# Patient Record
Sex: MALE | Race: WHITE | Employment: UNEMPLOYED | URBAN - METROPOLITAN AREA
[De-identification: names, ages, dates, MRNs, and addresses within clinical notes are randomized per-mention and may not be internally consistent; named-entity substitution may affect disease eponyms.]

---

## 2017-02-02 DIAGNOSIS — E03.9 HYPOTHYROIDISM (ACQUIRED): ICD-10-CM

## 2017-02-02 RX ORDER — LEVOTHYROXINE SODIUM 175 UG/1
TABLET ORAL
Qty: 30 TABLET | Refills: 0 | Status: SHIPPED | OUTPATIENT
Start: 2017-02-02 | End: 2017-02-23 | Stop reason: SDUPTHER

## 2017-02-23 ENCOUNTER — OFFICE VISIT (OUTPATIENT)
Dept: PRIMARY CARE CLINIC | Age: 32
End: 2017-02-23

## 2017-02-23 VITALS
DIASTOLIC BLOOD PRESSURE: 80 MMHG | OXYGEN SATURATION: 98 % | HEIGHT: 71 IN | TEMPERATURE: 95.7 F | RESPIRATION RATE: 16 BRPM | WEIGHT: 232.4 LBS | BODY MASS INDEX: 32.53 KG/M2 | HEART RATE: 69 BPM | SYSTOLIC BLOOD PRESSURE: 118 MMHG

## 2017-02-23 DIAGNOSIS — E03.9 HYPOTHYROIDISM (ACQUIRED): ICD-10-CM

## 2017-02-23 DIAGNOSIS — Z23 NEED FOR INFLUENZA VACCINATION: ICD-10-CM

## 2017-02-23 DIAGNOSIS — F31.32 BIPOLAR DISORDER WITH MODERATE DEPRESSION (HCC): ICD-10-CM

## 2017-02-23 DIAGNOSIS — G56.03 BILATERAL CARPAL TUNNEL SYNDROME: Primary | ICD-10-CM

## 2017-02-23 LAB
ALBUMIN SERPL-MCNC: 4.6 G/DL (ref 3.9–4.9)
ALP BLD-CCNC: 82 U/L (ref 35–104)
ALT SERPL-CCNC: 107 U/L (ref 0–41)
ANION GAP SERPL CALCULATED.3IONS-SCNC: 13 MEQ/L (ref 7–13)
AST SERPL-CCNC: 58 U/L (ref 0–40)
BILIRUB SERPL-MCNC: 1.1 MG/DL (ref 0–1.2)
BUN BLDV-MCNC: 17 MG/DL (ref 6–20)
CALCIUM SERPL-MCNC: 9.4 MG/DL (ref 8.6–10.2)
CHLORIDE BLD-SCNC: 101 MEQ/L (ref 98–107)
CO2: 25 MEQ/L (ref 22–29)
CREAT SERPL-MCNC: 0.84 MG/DL (ref 0.7–1.2)
GFR AFRICAN AMERICAN: >60
GFR NON-AFRICAN AMERICAN: >60
GLOBULIN: 2.9 G/DL (ref 2.3–3.5)
GLUCOSE BLD-MCNC: 75 MG/DL (ref 74–109)
POTASSIUM SERPL-SCNC: 4.1 MEQ/L (ref 3.5–5.1)
SODIUM BLD-SCNC: 139 MEQ/L (ref 132–144)
TOTAL PROTEIN: 7.5 G/DL (ref 6.4–8.1)
TSH SERPL DL<=0.05 MIU/L-ACNC: 9.04 UIU/ML (ref 0.27–4.2)

## 2017-02-23 PROCEDURE — 90688 IIV4 VACCINE SPLT 0.5 ML IM: CPT | Performed by: FAMILY MEDICINE

## 2017-02-23 PROCEDURE — 99214 OFFICE O/P EST MOD 30 MIN: CPT | Performed by: FAMILY MEDICINE

## 2017-02-23 PROCEDURE — 90471 IMMUNIZATION ADMIN: CPT | Performed by: FAMILY MEDICINE

## 2017-02-23 RX ORDER — PREDNISONE 10 MG/1
TABLET ORAL
Qty: 25 TABLET | Refills: 0 | Status: SHIPPED | OUTPATIENT
Start: 2017-02-23 | End: 2017-03-09

## 2017-02-23 RX ORDER — DULOXETIN HYDROCHLORIDE 60 MG/1
60 CAPSULE, DELAYED RELEASE ORAL DAILY
Qty: 30 CAPSULE | Refills: 3 | Status: SHIPPED | OUTPATIENT
Start: 2017-02-23 | End: 2017-06-29 | Stop reason: SDUPTHER

## 2017-02-23 RX ORDER — LEVOTHYROXINE SODIUM 175 UG/1
TABLET ORAL
Qty: 30 TABLET | Refills: 0 | Status: SHIPPED | OUTPATIENT
Start: 2017-02-23 | End: 2017-11-12

## 2017-02-23 ASSESSMENT — ENCOUNTER SYMPTOMS
CONSTIPATION: 0
ABDOMINAL PAIN: 0
DIARRHEA: 0

## 2017-02-24 LAB — T4 FREE: 1.17 NG/DL (ref 0.93–1.7)

## 2017-02-26 ENCOUNTER — OFFICE VISIT (OUTPATIENT)
Dept: PHYSICAL MEDICINE AND REHAB | Age: 32
End: 2017-02-26

## 2017-02-26 DIAGNOSIS — F41.9 ANXIETY: ICD-10-CM

## 2017-02-26 DIAGNOSIS — G44.221 CHRONIC TENSION-TYPE HEADACHE, INTRACTABLE: ICD-10-CM

## 2017-02-26 DIAGNOSIS — M47.812 CERVICAL SPONDYLOSIS WITHOUT MYELOPATHY: ICD-10-CM

## 2017-02-26 DIAGNOSIS — F33.41 RECURRENT MAJOR DEPRESSIVE DISORDER, IN PARTIAL REMISSION (HCC): ICD-10-CM

## 2017-02-26 DIAGNOSIS — G56.23 ULNAR NEUROPATHY OF BOTH UPPER EXTREMITIES: ICD-10-CM

## 2017-02-26 DIAGNOSIS — M25.531 PAIN IN BOTH WRISTS: ICD-10-CM

## 2017-02-26 DIAGNOSIS — M25.532 PAIN IN BOTH WRISTS: ICD-10-CM

## 2017-02-26 DIAGNOSIS — M54.2 NECK PAIN: ICD-10-CM

## 2017-02-26 DIAGNOSIS — G56.03 BILATERAL CARPAL TUNNEL SYNDROME: Primary | ICD-10-CM

## 2017-02-26 DIAGNOSIS — R29.898 HAND WEAKNESS: ICD-10-CM

## 2017-02-26 PROBLEM — I10 BP (HIGH BLOOD PRESSURE): Status: ACTIVE | Noted: 2017-02-26

## 2017-02-26 PROBLEM — B19.20 HEPATITIS C VIRUS INFECTION: Status: ACTIVE | Noted: 2017-02-26

## 2017-02-26 PROBLEM — E80.6 HYPERBILIRUBINEMIA: Status: ACTIVE | Noted: 2017-02-26

## 2017-02-26 PROBLEM — G44.209 TENSION TYPE HEADACHE: Status: ACTIVE | Noted: 2017-02-26

## 2017-02-26 PROBLEM — F32.4 MAJOR DEPRESSIVE DISORDER IN PARTIAL REMISSION (HCC): Status: ACTIVE | Noted: 2017-02-26

## 2017-02-26 PROBLEM — T40.1X1A DIACETYLMORPHINE OVERDOSE (HCC): Status: RESOLVED | Noted: 2017-02-26 | Resolved: 2017-02-26

## 2017-02-26 PROBLEM — T40.1X1A DIACETYLMORPHINE OVERDOSE (HCC): Status: ACTIVE | Noted: 2017-02-26

## 2017-02-26 PROBLEM — K75.81 STEATOHEPATITIS: Status: ACTIVE | Noted: 2017-02-26

## 2017-02-26 PROCEDURE — 95886 MUSC TEST DONE W/N TEST COMP: CPT | Performed by: PHYSICAL MEDICINE & REHABILITATION

## 2017-02-26 PROCEDURE — 95912 NRV CNDJ TEST 11-12 STUDIES: CPT | Performed by: PHYSICAL MEDICINE & REHABILITATION

## 2017-02-26 RX ORDER — TRANSPARENT DRESSING 2.37X2.75"
1 BANDAGE TOPICAL NIGHTLY
Qty: 1 EACH | Refills: 0 | Status: SHIPPED | OUTPATIENT
Start: 2017-02-26 | End: 2017-03-07

## 2017-03-05 ASSESSMENT — ENCOUNTER SYMPTOMS
SHORTNESS OF BREATH: 0
COUGH: 0

## 2017-03-07 ENCOUNTER — OFFICE VISIT (OUTPATIENT)
Dept: PRIMARY CARE CLINIC | Age: 32
End: 2017-03-07

## 2017-03-07 VITALS
TEMPERATURE: 98.1 F | WEIGHT: 223 LBS | HEART RATE: 72 BPM | BODY MASS INDEX: 31.22 KG/M2 | HEIGHT: 71 IN | DIASTOLIC BLOOD PRESSURE: 90 MMHG | RESPIRATION RATE: 14 BRPM | SYSTOLIC BLOOD PRESSURE: 132 MMHG

## 2017-03-07 DIAGNOSIS — E03.9 HYPOTHYROIDISM (ACQUIRED): ICD-10-CM

## 2017-03-07 DIAGNOSIS — B18.2 CHRONIC HEPATITIS C WITHOUT HEPATIC COMA (HCC): ICD-10-CM

## 2017-03-07 DIAGNOSIS — K75.81 STEATOHEPATITIS: ICD-10-CM

## 2017-03-07 DIAGNOSIS — R10.9 ABDOMINAL PAIN, UNSPECIFIED LOCATION: Primary | ICD-10-CM

## 2017-03-07 DIAGNOSIS — R17 JAUNDICE: ICD-10-CM

## 2017-03-07 DIAGNOSIS — R63.4 WEIGHT LOSS: ICD-10-CM

## 2017-03-07 DIAGNOSIS — R10.9 ABDOMINAL PAIN, UNSPECIFIED LOCATION: ICD-10-CM

## 2017-03-07 LAB
BILIRUBIN, POC: NORMAL
BLOOD URINE, POC: NORMAL
CLARITY, POC: NORMAL
COLOR, POC: NORMAL
GLUCOSE URINE, POC: NORMAL
KETONES, POC: NORMAL
LEUKOCYTE EST, POC: NORMAL
NITRITE, POC: NORMAL
PH, POC: 6
PROTEIN, POC: NORMAL
SPECIFIC GRAVITY, POC: 1.03
UROBILINOGEN, POC: NORMAL

## 2017-03-07 PROCEDURE — 99214 OFFICE O/P EST MOD 30 MIN: CPT | Performed by: FAMILY MEDICINE

## 2017-03-07 PROCEDURE — 81002 URINALYSIS NONAUTO W/O SCOPE: CPT | Performed by: FAMILY MEDICINE

## 2017-03-07 RX ORDER — LEVOTHYROXINE SODIUM 200 MCG
200 TABLET ORAL DAILY
Qty: 90 TABLET | Refills: 3 | Status: SHIPPED | OUTPATIENT
Start: 2017-03-07 | End: 2018-07-31

## 2017-03-07 ASSESSMENT — ENCOUNTER SYMPTOMS
BELCHING: 0
VOMITING: 0
DIARRHEA: 0
FLATUS: 0
NAUSEA: 1
CONSTIPATION: 0
ABDOMINAL PAIN: 1
HEMATOCHEZIA: 0

## 2017-03-09 LAB — URINE CULTURE, ROUTINE: NORMAL

## 2017-06-29 DIAGNOSIS — F31.32 BIPOLAR DISORDER WITH MODERATE DEPRESSION (HCC): ICD-10-CM

## 2017-06-29 RX ORDER — DULOXETIN HYDROCHLORIDE 60 MG/1
CAPSULE, DELAYED RELEASE ORAL
Qty: 30 CAPSULE | Refills: 2 | Status: ON HOLD | OUTPATIENT
Start: 2017-06-29 | End: 2017-11-19 | Stop reason: HOSPADM

## 2017-11-11 ENCOUNTER — HOSPITAL ENCOUNTER (INPATIENT)
Age: 32
LOS: 7 days | Discharge: HOME | DRG: 885 | End: 2017-11-19
Attending: PSYCHIATRY & NEUROLOGY | Admitting: PSYCHIATRY & NEUROLOGY
Payer: COMMERCIAL

## 2017-11-11 DIAGNOSIS — F32.2 SEVERE MAJOR DEPRESSION (HCC): Primary | ICD-10-CM

## 2017-11-11 PROCEDURE — 82550 ASSAY OF CK (CPK): CPT

## 2017-11-11 PROCEDURE — 99285 EMERGENCY DEPT VISIT HI MDM: CPT

## 2017-11-11 PROCEDURE — 84443 ASSAY THYROID STIM HORMONE: CPT

## 2017-11-11 PROCEDURE — 82553 CREATINE MB FRACTION: CPT

## 2017-11-11 PROCEDURE — G0480 DRUG TEST DEF 1-7 CLASSES: HCPCS

## 2017-11-11 PROCEDURE — 80053 COMPREHEN METABOLIC PANEL: CPT

## 2017-11-11 PROCEDURE — 0HQEXZZ REPAIR LEFT LOWER ARM SKIN, EXTERNAL APPROACH: ICD-10-PCS | Performed by: PHYSICIAN ASSISTANT

## 2017-11-12 LAB
ALBUMIN SERPL-MCNC: 4.3 G/DL (ref 3.9–4.9)
ALP BLD-CCNC: 82 U/L (ref 35–104)
ALT SERPL-CCNC: 102 U/L (ref 0–41)
AMPHETAMINE SCREEN, URINE: ABNORMAL
ANION GAP SERPL CALCULATED.3IONS-SCNC: 15 MEQ/L (ref 7–13)
AST SERPL-CCNC: 80 U/L (ref 0–40)
BARBITURATE SCREEN URINE: ABNORMAL
BASOPHILS ABSOLUTE: 0.1 K/UL (ref 0–0.2)
BASOPHILS RELATIVE PERCENT: 0.5 %
BENZODIAZEPINE SCREEN, URINE: ABNORMAL
BILIRUB SERPL-MCNC: 0.3 MG/DL (ref 0–1.2)
BILIRUBIN URINE: NEGATIVE
BLOOD, URINE: NEGATIVE
BUN BLDV-MCNC: 8 MG/DL (ref 6–20)
CALCIUM SERPL-MCNC: 9.3 MG/DL (ref 8.6–10.2)
CANNABINOID SCREEN URINE: POSITIVE
CHLORIDE BLD-SCNC: 107 MEQ/L (ref 98–107)
CK MB: 6.2 NG/ML (ref 0–6.7)
CLARITY: CLEAR
CO2: 23 MEQ/L (ref 22–29)
COCAINE METABOLITE SCREEN URINE: ABNORMAL
COLOR: YELLOW
CREAT SERPL-MCNC: 0.7 MG/DL (ref 0.7–1.2)
CREATINE KINASE-MB INDEX: 1.5 % (ref 0–3.5)
EKG ATRIAL RATE: 59 BPM
EKG P AXIS: 20 DEGREES
EKG P-R INTERVAL: 176 MS
EKG Q-T INTERVAL: 398 MS
EKG QRS DURATION: 88 MS
EKG QTC CALCULATION (BAZETT): 394 MS
EKG R AXIS: -2 DEGREES
EKG T AXIS: 13 DEGREES
EKG VENTRICULAR RATE: 59 BPM
EOSINOPHILS ABSOLUTE: 0.1 K/UL (ref 0–0.7)
EOSINOPHILS RELATIVE PERCENT: 1.1 %
ETHANOL PERCENT: 0.09 G/DL
ETHANOL PERCENT: 0.23 G/DL
ETHANOL: 107 MG/DL (ref 0–0.08)
ETHANOL: 265 MG/DL (ref 0–0.08)
GFR AFRICAN AMERICAN: >60
GFR NON-AFRICAN AMERICAN: >60
GLOBULIN: 3.1 G/DL (ref 2.3–3.5)
GLUCOSE BLD-MCNC: 98 MG/DL (ref 74–109)
GLUCOSE URINE: NEGATIVE MG/DL
HCT VFR BLD CALC: 49.7 % (ref 42–52)
HEMOGLOBIN: 17 G/DL (ref 14–18)
KETONES, URINE: NEGATIVE MG/DL
LEUKOCYTE ESTERASE, URINE: NEGATIVE
LYMPHOCYTES ABSOLUTE: 3.4 K/UL (ref 1–4.8)
LYMPHOCYTES RELATIVE PERCENT: 33.5 %
Lab: ABNORMAL
MCH RBC QN AUTO: 32 PG (ref 27–31.3)
MCHC RBC AUTO-ENTMCNC: 34.2 % (ref 33–37)
MCV RBC AUTO: 93.7 FL (ref 80–100)
METHADONE SCREEN, URINE: ABNORMAL
MONOCYTES ABSOLUTE: 0.7 K/UL (ref 0.2–0.8)
MONOCYTES RELATIVE PERCENT: 6.8 %
NEUTROPHILS ABSOLUTE: 5.9 K/UL (ref 1.4–6.5)
NEUTROPHILS RELATIVE PERCENT: 58.1 %
NITRITE, URINE: NEGATIVE
OPIATE SCREEN URINE: ABNORMAL
PDW BLD-RTO: 13.4 % (ref 11.5–14.5)
PH UA: 5.5 (ref 5–9)
PHENCYCLIDINE SCREEN URINE: ABNORMAL
PLATELET # BLD: 248 K/UL (ref 130–400)
POTASSIUM SERPL-SCNC: 4.4 MEQ/L (ref 3.5–5.1)
PROTEIN UA: NEGATIVE MG/DL
RBC # BLD: 5.31 M/UL (ref 4.7–6.1)
SODIUM BLD-SCNC: 145 MEQ/L (ref 132–144)
SPECIFIC GRAVITY UA: 1.01 (ref 1–1.03)
TOTAL CK: 410 U/L (ref 0–190)
TOTAL PROTEIN: 7.4 G/DL (ref 6.4–8.1)
TRICYCLIC, URINE: ABNORMAL
TSH SERPL DL<=0.05 MIU/L-ACNC: 8.36 UIU/ML (ref 0.27–4.2)
UROBILINOGEN, URINE: 0.2 E.U./DL
WBC # BLD: 10.1 K/UL (ref 4.8–10.8)

## 2017-11-12 PROCEDURE — 93005 ELECTROCARDIOGRAM TRACING: CPT

## 2017-11-12 PROCEDURE — 36415 COLL VENOUS BLD VENIPUNCTURE: CPT

## 2017-11-12 PROCEDURE — 6360000002 HC RX W HCPCS: Performed by: FAMILY MEDICINE

## 2017-11-12 PROCEDURE — 85025 COMPLETE CBC W/AUTO DIFF WBC: CPT

## 2017-11-12 PROCEDURE — 2500000003 HC RX 250 WO HCPCS: Performed by: PHYSICIAN ASSISTANT

## 2017-11-12 PROCEDURE — 6370000000 HC RX 637 (ALT 250 FOR IP): Performed by: FAMILY MEDICINE

## 2017-11-12 PROCEDURE — 6370000000 HC RX 637 (ALT 250 FOR IP): Performed by: PSYCHIATRY & NEUROLOGY

## 2017-11-12 PROCEDURE — 6370000000 HC RX 637 (ALT 250 FOR IP): Performed by: NURSE PRACTITIONER

## 2017-11-12 PROCEDURE — 81003 URINALYSIS AUTO W/O SCOPE: CPT

## 2017-11-12 PROCEDURE — 6370000000 HC RX 637 (ALT 250 FOR IP): Performed by: PHYSICIAN ASSISTANT

## 2017-11-12 PROCEDURE — G0480 DRUG TEST DEF 1-7 CLASSES: HCPCS

## 2017-11-12 PROCEDURE — 1240000000 HC EMOTIONAL WELLNESS R&B

## 2017-11-12 PROCEDURE — 80307 DRUG TEST PRSMV CHEM ANLYZR: CPT

## 2017-11-12 RX ORDER — LIDOCAINE HYDROCHLORIDE AND EPINEPHRINE 10; 10 MG/ML; UG/ML
20 INJECTION, SOLUTION INFILTRATION; PERINEURAL ONCE
Status: COMPLETED | OUTPATIENT
Start: 2017-11-12 | End: 2017-11-12

## 2017-11-12 RX ORDER — LIDOCAINE HYDROCHLORIDE AND EPINEPHRINE BITARTRATE 20; .01 MG/ML; MG/ML
20 INJECTION, SOLUTION SUBCUTANEOUS ONCE
Status: DISCONTINUED | OUTPATIENT
Start: 2017-11-12 | End: 2017-11-12

## 2017-11-12 RX ORDER — HYDROXYZINE PAMOATE 50 MG/1
50 CAPSULE ORAL EVERY 6 HOURS PRN
Status: DISCONTINUED | OUTPATIENT
Start: 2017-11-12 | End: 2017-11-19 | Stop reason: HOSPADM

## 2017-11-12 RX ORDER — MAGNESIUM HYDROXIDE/ALUMINUM HYDROXICE/SIMETHICONE 120; 1200; 1200 MG/30ML; MG/30ML; MG/30ML
30 SUSPENSION ORAL PRN
Status: DISCONTINUED | OUTPATIENT
Start: 2017-11-12 | End: 2017-11-19 | Stop reason: HOSPADM

## 2017-11-12 RX ORDER — CHLORDIAZEPOXIDE HYDROCHLORIDE 25 MG/1
25 CAPSULE, GELATIN COATED ORAL EVERY 4 HOURS PRN
Status: DISCONTINUED | OUTPATIENT
Start: 2017-11-12 | End: 2017-11-15 | Stop reason: ALTCHOICE

## 2017-11-12 RX ORDER — CHLORDIAZEPOXIDE HYDROCHLORIDE 25 MG/1
50 CAPSULE, GELATIN COATED ORAL
Status: DISCONTINUED | OUTPATIENT
Start: 2017-11-12 | End: 2017-11-15 | Stop reason: ALTCHOICE

## 2017-11-12 RX ORDER — THIAMINE MONONITRATE (VIT B1) 100 MG
100 TABLET ORAL DAILY
Status: DISCONTINUED | OUTPATIENT
Start: 2017-11-12 | End: 2017-11-19

## 2017-11-12 RX ORDER — MULTIVITAMIN WITH FOLIC ACID 400 MCG
1 TABLET ORAL DAILY
Status: DISCONTINUED | OUTPATIENT
Start: 2017-11-12 | End: 2017-11-19

## 2017-11-12 RX ORDER — NICOTINE 21 MG/24HR
1 PATCH, TRANSDERMAL 24 HOURS TRANSDERMAL DAILY
Status: DISCONTINUED | OUTPATIENT
Start: 2017-11-12 | End: 2017-11-19 | Stop reason: HOSPADM

## 2017-11-12 RX ORDER — CHLORDIAZEPOXIDE HYDROCHLORIDE 25 MG/1
50 CAPSULE, GELATIN COATED ORAL ONCE
Status: COMPLETED | OUTPATIENT
Start: 2017-11-12 | End: 2017-11-12

## 2017-11-12 RX ORDER — DULOXETIN HYDROCHLORIDE 60 MG/1
60 CAPSULE, DELAYED RELEASE ORAL DAILY
Status: DISCONTINUED | OUTPATIENT
Start: 2017-11-13 | End: 2017-11-13

## 2017-11-12 RX ORDER — ACETAMINOPHEN 325 MG/1
650 TABLET ORAL EVERY 4 HOURS PRN
Status: DISCONTINUED | OUTPATIENT
Start: 2017-11-12 | End: 2017-11-19 | Stop reason: HOSPADM

## 2017-11-12 RX ORDER — HALOPERIDOL 5 MG/ML
5 INJECTION INTRAMUSCULAR EVERY 4 HOURS PRN
Status: DISCONTINUED | OUTPATIENT
Start: 2017-11-12 | End: 2017-11-15 | Stop reason: ALTCHOICE

## 2017-11-12 RX ORDER — TRAZODONE HYDROCHLORIDE 50 MG/1
50 TABLET ORAL NIGHTLY PRN
Status: DISCONTINUED | OUTPATIENT
Start: 2017-11-12 | End: 2017-11-15

## 2017-11-12 RX ORDER — HYDROXYZINE HYDROCHLORIDE 50 MG/ML
50 INJECTION, SOLUTION INTRAMUSCULAR EVERY 6 HOURS PRN
Status: DISCONTINUED | OUTPATIENT
Start: 2017-11-12 | End: 2017-11-12 | Stop reason: SDUPTHER

## 2017-11-12 RX ORDER — LORAZEPAM 1 MG/1
4 TABLET ORAL
Status: DISCONTINUED | OUTPATIENT
Start: 2017-11-12 | End: 2017-11-19 | Stop reason: HOSPADM

## 2017-11-12 RX ORDER — FOLIC ACID 1 MG/1
1 TABLET ORAL DAILY
Status: DISCONTINUED | OUTPATIENT
Start: 2017-11-12 | End: 2017-11-19

## 2017-11-12 RX ORDER — IBUPROFEN 600 MG/1
600 TABLET ORAL EVERY 6 HOURS PRN
Status: DISCONTINUED | OUTPATIENT
Start: 2017-11-12 | End: 2017-11-19 | Stop reason: HOSPADM

## 2017-11-12 RX ORDER — IBUPROFEN 800 MG/1
800 TABLET ORAL ONCE
Status: COMPLETED | OUTPATIENT
Start: 2017-11-12 | End: 2017-11-12

## 2017-11-12 RX ORDER — CHLORDIAZEPOXIDE HYDROCHLORIDE 25 MG/1
75 CAPSULE, GELATIN COATED ORAL
Status: DISCONTINUED | OUTPATIENT
Start: 2017-11-12 | End: 2017-11-15 | Stop reason: ALTCHOICE

## 2017-11-12 RX ORDER — CHLORDIAZEPOXIDE HYDROCHLORIDE 10 MG/1
10 CAPSULE, GELATIN COATED ORAL EVERY 4 HOURS PRN
Status: DISCONTINUED | OUTPATIENT
Start: 2017-11-12 | End: 2017-11-15 | Stop reason: ALTCHOICE

## 2017-11-12 RX ORDER — BENZTROPINE MESYLATE 1 MG/ML
2 INJECTION INTRAMUSCULAR; INTRAVENOUS 2 TIMES DAILY PRN
Status: DISCONTINUED | OUTPATIENT
Start: 2017-11-12 | End: 2017-11-19 | Stop reason: HOSPADM

## 2017-11-12 RX ADMIN — IBUPROFEN 600 MG: 600 TABLET, FILM COATED ORAL at 21:02

## 2017-11-12 RX ADMIN — HYDROXYZINE PAMOATE 50 MG: 50 CAPSULE ORAL at 17:42

## 2017-11-12 RX ADMIN — THERA TABS 1 TABLET: TAB at 16:28

## 2017-11-12 RX ADMIN — ACETAMINOPHEN 650 MG: 325 TABLET ORAL at 16:28

## 2017-11-12 RX ADMIN — CHLORDIAZEPOXIDE HYDROCHLORIDE 50 MG: 25 CAPSULE ORAL at 07:21

## 2017-11-12 RX ADMIN — Medication 100 MG: at 16:28

## 2017-11-12 RX ADMIN — HYDROXYZINE HYDROCHLORIDE 50 MG: 50 INJECTION, SOLUTION INTRAMUSCULAR at 12:22

## 2017-11-12 RX ADMIN — TRAZODONE HYDROCHLORIDE 50 MG: 50 TABLET ORAL at 21:02

## 2017-11-12 RX ADMIN — IBUPROFEN 800 MG: 800 TABLET, FILM COATED ORAL at 12:56

## 2017-11-12 RX ADMIN — LIDOCAINE HYDROCHLORIDE AND EPINEPHRINE 20 ML: 10; 10 INJECTION, SOLUTION INFILTRATION; PERINEURAL at 01:11

## 2017-11-12 ASSESSMENT — PAIN DESCRIPTION - LOCATION: LOCATION: WRIST

## 2017-11-12 ASSESSMENT — PAIN SCALES - GENERAL
PAINLEVEL_OUTOF10: 4
PAINLEVEL_OUTOF10: 8
PAINLEVEL_OUTOF10: 8
PAINLEVEL_OUTOF10: 5
PAINLEVEL_OUTOF10: 0

## 2017-11-12 ASSESSMENT — ENCOUNTER SYMPTOMS
COLOR CHANGE: 0
SHORTNESS OF BREATH: 0
ALLERGIC/IMMUNOLOGIC NEGATIVE: 1
TROUBLE SWALLOWING: 0
APNEA: 0
ABDOMINAL PAIN: 0
EYE PAIN: 0

## 2017-11-12 ASSESSMENT — PAIN DESCRIPTION - ORIENTATION: ORIENTATION: LEFT

## 2017-11-12 ASSESSMENT — PATIENT HEALTH QUESTIONNAIRE - PHQ9: SUM OF ALL RESPONSES TO PHQ QUESTIONS 1-9: 7

## 2017-11-12 NOTE — ED PROVIDER NOTES
sleep disturbance. All other systems reviewed and are negative. Except as noted above the remainder of the review of systems was reviewed and negative.        PAST MEDICAL HISTORY     Past Medical History:   Diagnosis Date    Anxiety     Bilateral carpal tunnel syndrome     Bipolar disorder with moderate depression ([facility] [address] 10/12/2015    BP (high blood pressure)     Depression     Diacetylmorphine overdose 2/26/2017    Hepatitis C virus infection     Hyperthyroidism     Hypothyroidism 3/7/2017    Hypothyroidism (acquired) 6/23/2015    Steatohepatitis          SURGICAL HISTORY       Past Surgical History:   Procedure Laterality Date    APPENDECTOMY           CURRENT MEDICATIONS       Current Discharge Medication List      CONTINUE these medications which have NOT CHANGED    Details   DULoxetine (CYMBALTA) 60 MG extended release capsule Take 1 capsule by mouth daily  Qty: 30 capsule, Refills: 2    Associated Diagnoses: Bipolar disorder with moderate depression (HCC)      SYNTHROID 200 MCG tablet Take 1 tablet by mouth daily  Qty: 90 tablet, Refills: 3             ALLERGIES     Sulfa antibiotics    FAMILY HISTORY       Family History   Problem Relation Age of Onset    Depression Mother     Depression Father     Depression Sister     Depression Brother     Cancer Maternal Grandfather     Heart Disease Paternal Grandmother     High Blood Pressure Paternal Grandmother     Other Paternal Grandfather           SOCIAL HISTORY       Social History     Social History    Marital status: Single     Spouse name: N/A    Number of children: N/A    Years of education: N/A     Social History Main Topics    Smoking status: Current Every Day Smoker     Packs/day: 1.00     Years: 15.00     Types: Cigarettes    Smokeless tobacco: Never Used    Alcohol use Yes      Comment: patient admits to being sober for a year but relapsing recently and having 10 beers tonight     Drug use:      Types: Opiates
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
## 2017-11-13 PROBLEM — F31.81 BIPOLAR 2 DISORDER, MAJOR DEPRESSIVE EPISODE (HCC): Status: ACTIVE | Noted: 2017-11-13

## 2017-11-13 PROBLEM — F10.239 ALCOHOL DEPENDENCE WITH WITHDRAWAL (HCC): Status: ACTIVE | Noted: 2017-11-13

## 2017-11-13 PROCEDURE — 6370000000 HC RX 637 (ALT 250 FOR IP): Performed by: PHYSICIAN ASSISTANT

## 2017-11-13 PROCEDURE — 99223 1ST HOSP IP/OBS HIGH 75: CPT | Performed by: PSYCHIATRY & NEUROLOGY

## 2017-11-13 PROCEDURE — 6370000000 HC RX 637 (ALT 250 FOR IP): Performed by: NURSE PRACTITIONER

## 2017-11-13 PROCEDURE — 93010 ELECTROCARDIOGRAM REPORT: CPT | Performed by: INTERNAL MEDICINE

## 2017-11-13 PROCEDURE — 1240000000 HC EMOTIONAL WELLNESS R&B

## 2017-11-13 PROCEDURE — 6370000000 HC RX 637 (ALT 250 FOR IP): Performed by: PSYCHIATRY & NEUROLOGY

## 2017-11-13 RX ORDER — BACITRACIN, NEOMYCIN, POLYMYXIN B 400; 3.5; 5 [USP'U]/G; MG/G; [USP'U]/G
OINTMENT TOPICAL 2 TIMES DAILY
Status: DISCONTINUED | OUTPATIENT
Start: 2017-11-13 | End: 2017-11-19

## 2017-11-13 RX ORDER — DULOXETIN HYDROCHLORIDE 30 MG/1
30 CAPSULE, DELAYED RELEASE ORAL DAILY
Status: DISCONTINUED | OUTPATIENT
Start: 2017-11-14 | End: 2017-11-16

## 2017-11-13 RX ADMIN — CHLORDIAZEPOXIDE HYDROCHLORIDE 10 MG: 10 CAPSULE ORAL at 09:18

## 2017-11-13 RX ADMIN — BACITRACIN ZINC, NEOMYCIN SULFATE, POLYMYXIN B SULFATE 3.5 G: 3.5; 5000; 4 OINTMENT TOPICAL at 13:41

## 2017-11-13 RX ADMIN — HYDROXYZINE PAMOATE 50 MG: 50 CAPSULE ORAL at 16:04

## 2017-11-13 RX ADMIN — LURASIDONE HYDROCHLORIDE 20 MG: 20 TABLET, FILM COATED ORAL at 13:42

## 2017-11-13 RX ADMIN — IBUPROFEN 600 MG: 600 TABLET, FILM COATED ORAL at 09:18

## 2017-11-13 RX ADMIN — FOLIC ACID 1 MG: 1 TABLET ORAL at 09:18

## 2017-11-13 RX ADMIN — THERA TABS 1 TABLET: TAB at 09:19

## 2017-11-13 RX ADMIN — TRAZODONE HYDROCHLORIDE 50 MG: 50 TABLET ORAL at 21:23

## 2017-11-13 RX ADMIN — Medication 100 MG: at 09:19

## 2017-11-13 RX ADMIN — BACITRACIN ZINC, NEOMYCIN SULFATE, POLYMYXIN B SULFATE: 3.5; 5000; 4 OINTMENT TOPICAL at 19:29

## 2017-11-13 RX ADMIN — CHLORDIAZEPOXIDE HYDROCHLORIDE 10 MG: 10 CAPSULE ORAL at 19:36

## 2017-11-13 RX ADMIN — CHLORDIAZEPOXIDE HYDROCHLORIDE 25 MG: 25 CAPSULE ORAL at 13:42

## 2017-11-13 RX ADMIN — HYDROXYZINE PAMOATE 50 MG: 50 CAPSULE ORAL at 09:18

## 2017-11-13 RX ADMIN — DULOXETINE HYDROCHLORIDE 60 MG: 60 CAPSULE, DELAYED RELEASE ORAL at 09:19

## 2017-11-13 RX ADMIN — ACETAMINOPHEN 650 MG: 325 TABLET ORAL at 13:41

## 2017-11-13 RX ADMIN — LEVOTHYROXINE SODIUM 200 MCG: 88 TABLET ORAL at 09:18

## 2017-11-13 RX ADMIN — IBUPROFEN 600 MG: 600 TABLET, FILM COATED ORAL at 16:04

## 2017-11-13 RX ADMIN — ACETAMINOPHEN 650 MG: 325 TABLET ORAL at 19:36

## 2017-11-13 ASSESSMENT — PAIN SCALES - GENERAL
PAINLEVEL_OUTOF10: 5
PAINLEVEL_OUTOF10: 6

## 2017-11-13 ASSESSMENT — SLEEP AND FATIGUE QUESTIONNAIRES
DO YOU HAVE DIFFICULTY SLEEPING: NO
DO YOU USE A SLEEP AID: NO
SLEEP PATTERN: NORMAL

## 2017-11-13 ASSESSMENT — LIFESTYLE VARIABLES: HISTORY_ALCOHOL_USE: YES
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
## 2017-11-13 NOTE — PLAN OF CARE
Problem: Altered Mood, Depressive Behavior  Goal: LTG-Able to verbalize acceptance of life and situations over which he or she has no control  Outcome: Ongoing    Goal: LTG-Able to verbalize and/or display a decrease in depressive symptoms  Outcome: Ongoing    Goal: STG-Absence of Self Harm  Outcome: Ongoing    Goal: STG-Knowledge of positive coping patterns  Outcome: Ongoing      Problem: Substance Abuse  Goal: LTG-Absence of acute withdrawl symptoms  Outcome: Ongoing    Goal: STG-Knowledge of community resources  Outcome: Ongoing
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
## 2017-11-13 NOTE — PROGRESS NOTES
Group Therapy Note    Date: 11/13/2017  Start Time: 1100  End Time:  1127  Number of Participants: 6    Type of Group: Psychoeducation    Wellness Binder Information  Module Name:    Session Number:      Patient's Goal:  \"To feel better\"    Notes:  Patient was attentive, helpful to other patients and overall participation was good. Status After Intervention:  Unchanged    Participation Level:  Active Listener    Participation Quality: Appropriate      Speech:  normal      Thought Process/Content: Linear      Affective Functioning: Congruent      Mood: calm      Level of consciousness:  Alert and Oriented x4      Response to Learning: Able to verbalize current knowledge/experience      Endings: None Reported    Modes of Intervention: Education, Socialization and Activity      Discipline Responsible: Psychoeducational Specialist      Signature:  John Echavarria

## 2017-11-13 NOTE — PROGRESS NOTES
Pt. attended the 0900 community meeting. Electronically signed by Laura [address] on 11/13/2017 at 10:01 AM

## 2017-11-13 NOTE — PROGRESS NOTES
Group Therapy Note    Date: 11/13/2017  Start Time: 10am  End Time:  11am  Number of Participants: 7    Type of Group: Psychotherapy    Wellness Binder Information  Module Name:  psychotherapy  Session Number:      Patient's Goal:  Improve coping, gain support    Notes: Talked about change and relapse     Status After Intervention:  Improved    Participation Level:  Active Listener and Interactive    Participation Quality: Appropriate, Attentive and Sharing      Speech:  normal      Thought Process/Content: Logical      Affective Functioning: Congruent      Mood: anxious and depressed      Level of consciousness:  Alert, Oriented x4 and Attentive      Response to Learning: Able to verbalize current knowledge/experience, Able to verbalize/acknowledge new learning and Capable of insight      Endings: None Reported    Modes of Intervention: Education, Support, Socialization, Exploration, Clarifying and Problem-solving      Discipline Responsible: /Counselor      Signature:  Orquidea Nava

## 2017-11-13 NOTE — BH NOTE
Patient did not attend recreation group at 1915 or wrap up group at 2000.     Electronically signed by Matt Moreno on 11/12/2017 at 9:48 PM

## 2017-11-13 NOTE — PROGRESS NOTES
Patient denies SI, HI, and AVH. Patient states that depression has improved, but anxiety remains high. Patient brief with answers, but reports that he is hopeful that mood will continue to improve now that he has restarted medications. Patient reports that due to personal circumstance he was unable to get prescriptions filled for a period of time, and then once he did get meds filled his supply was destroyed. Patient polite and cooperative with staff, isolates to room often. Does not appear social with peers at this time.  Electronically signed by Linda Agrawal LPN on 81/07/6312 at 6:21 PM

## 2017-11-13 NOTE — CONSULTS
[facility]  MEDICINE    HISTORY AND PHYSICAL EXAM    PATIENT NAME:  Miguelito Limon    MRN:  [id number]  SERVICE DATE:  11/13/2017   SERVICE TIME:  11:00 AM    Primary Care Physician: Toni Mcmillan DO         SUBJECTIVE  CHIEF COMPLAINT:  Medical clear for inpatient psychiatry admission. Consult for medical H/P encounter. HPI:  This is a 34 y.o. male who presents with suicidal attempt, cut wrist. Laceration to left wrist. Sutures to left wrist in ED. Denies CP, SOB, fever or chills, N/V    PAST MEDICAL HISTORY:    Past Medical History:   Diagnosis Date    Anxiety     Bilateral carpal tunnel syndrome     Bipolar disorder with moderate depression ([facility] [address] 10/12/2015    BP (high blood pressure)     Depression     Diacetylmorphine overdose 2/26/2017    Hepatitis C virus infection     Hyperthyroidism     Hypothyroidism 3/7/2017    Hypothyroidism (acquired) 6/23/2015    Steatohepatitis      PAST SURGICAL HISTORY:    Past Surgical History:   Procedure Laterality Date    APPENDECTOMY       FAMILY HISTORY:    Family History   Problem Relation Age of Onset    Depression Mother     Depression Father     Depression Sister     Depression Brother     Cancer Maternal Grandfather     Heart Disease Paternal Grandmother     High Blood Pressure Paternal Grandmother     Other Paternal Grandfather      SOCIAL HISTORY:    Social History     Social History    Marital status: Single     Spouse name: N/A    Number of children: N/A    Years of education: N/A     Occupational History    Not on file.      Social History Main Topics    Smoking status: Current Every Day Smoker     Packs/day: 1.00     Years: 15.00     Types: Cigarettes    Smokeless tobacco: Never Used    Alcohol use Yes      Comment: patient admits to being sober for a year but relapsing recently and having 10 beers tonight     Drug use:      Types: Opiates       Comment: 3 days ago    Sexual activity: Yes     Partners: Female     Other

## 2017-11-14 PROCEDURE — 6370000000 HC RX 637 (ALT 250 FOR IP): Performed by: PHYSICIAN ASSISTANT

## 2017-11-14 PROCEDURE — 6370000000 HC RX 637 (ALT 250 FOR IP): Performed by: NURSE PRACTITIONER

## 2017-11-14 PROCEDURE — 6360000002 HC RX W HCPCS: Performed by: PSYCHIATRY & NEUROLOGY

## 2017-11-14 PROCEDURE — 6370000000 HC RX 637 (ALT 250 FOR IP): Performed by: PSYCHIATRY & NEUROLOGY

## 2017-11-14 PROCEDURE — 1240000000 HC EMOTIONAL WELLNESS R&B

## 2017-11-14 PROCEDURE — 99233 SBSQ HOSP IP/OBS HIGH 50: CPT | Performed by: PSYCHIATRY & NEUROLOGY

## 2017-11-14 RX ORDER — GABAPENTIN 100 MG/1
100 CAPSULE ORAL 3 TIMES DAILY
Status: DISCONTINUED | OUTPATIENT
Start: 2017-11-14 | End: 2017-11-15

## 2017-11-14 RX ORDER — ONDANSETRON 4 MG/1
4 TABLET, FILM COATED ORAL EVERY 8 HOURS PRN
Status: DISCONTINUED | OUTPATIENT
Start: 2017-11-14 | End: 2017-11-19 | Stop reason: HOSPADM

## 2017-11-14 RX ADMIN — HYDROXYZINE PAMOATE 50 MG: 50 CAPSULE ORAL at 21:50

## 2017-11-14 RX ADMIN — IBUPROFEN 600 MG: 600 TABLET, FILM COATED ORAL at 09:27

## 2017-11-14 RX ADMIN — DULOXETINE HYDROCHLORIDE 30 MG: 30 CAPSULE, DELAYED RELEASE ORAL at 08:53

## 2017-11-14 RX ADMIN — ACETAMINOPHEN 650 MG: 325 TABLET ORAL at 17:17

## 2017-11-14 RX ADMIN — LURASIDONE HYDROCHLORIDE 20 MG: 20 TABLET, FILM COATED ORAL at 08:53

## 2017-11-14 RX ADMIN — LEVOTHYROXINE SODIUM 200 MCG: 88 TABLET ORAL at 05:52

## 2017-11-14 RX ADMIN — FOLIC ACID 1 MG: 1 TABLET ORAL at 08:53

## 2017-11-14 RX ADMIN — CHLORDIAZEPOXIDE HYDROCHLORIDE 10 MG: 10 CAPSULE ORAL at 02:28

## 2017-11-14 RX ADMIN — IBUPROFEN 600 MG: 600 TABLET, FILM COATED ORAL at 15:45

## 2017-11-14 RX ADMIN — CHLORDIAZEPOXIDE HYDROCHLORIDE 25 MG: 25 CAPSULE ORAL at 09:26

## 2017-11-14 RX ADMIN — THERA TABS 1 TABLET: TAB at 08:53

## 2017-11-14 RX ADMIN — HYDROXYZINE PAMOATE 50 MG: 50 CAPSULE ORAL at 15:45

## 2017-11-14 RX ADMIN — BACITRACIN ZINC, NEOMYCIN SULFATE, POLYMYXIN B SULFATE: 3.5; 5000; 4 OINTMENT TOPICAL at 19:59

## 2017-11-14 RX ADMIN — TRAZODONE HYDROCHLORIDE 50 MG: 50 TABLET ORAL at 21:50

## 2017-11-14 RX ADMIN — HYDROXYZINE PAMOATE 50 MG: 50 CAPSULE ORAL at 09:27

## 2017-11-14 RX ADMIN — BACITRACIN ZINC, NEOMYCIN SULFATE, POLYMYXIN B SULFATE 3.5 G: 3.5; 5000; 4 OINTMENT TOPICAL at 08:53

## 2017-11-14 RX ADMIN — IBUPROFEN 600 MG: 600 TABLET, FILM COATED ORAL at 21:50

## 2017-11-14 RX ADMIN — IBUPROFEN 600 MG: 600 TABLET, FILM COATED ORAL at 02:28

## 2017-11-14 RX ADMIN — Medication 100 MG: at 08:53

## 2017-11-14 RX ADMIN — ONDANSETRON HYDROCHLORIDE 4 MG: 4 TABLET, FILM COATED ORAL at 12:14

## 2017-11-14 RX ADMIN — HYDROXYZINE PAMOATE 50 MG: 50 CAPSULE ORAL at 02:28

## 2017-11-14 RX ADMIN — GABAPENTIN 100 MG: 100 CAPSULE ORAL at 14:44

## 2017-11-14 RX ADMIN — GABAPENTIN 100 MG: 100 CAPSULE ORAL at 21:50

## 2017-11-14 ASSESSMENT — PAIN SCALES - GENERAL
PAINLEVEL_OUTOF10: 4
PAINLEVEL_OUTOF10: 4
PAINLEVEL_OUTOF10: 6
PAINLEVEL_OUTOF10: 3
PAINLEVEL_OUTOF10: 5

## 2017-11-14 NOTE — PROGRESS NOTES
Group Therapy Note    Date: 11/14/2017  Start Time:1430  End Time: 1500    Number of Participants:6    Type of Group: Psychoeducation    Patient's Goal: To participate in mood management group. Notes:  Patient learned about the cycle of depression. Status After Intervention:  Improved    Participation Level: Active Listener    Participation Quality: Appropriate      Speech:  normal      Thought Process/Content: Logical      Affective Functioning: Congruent      Mood: elevated      Level of consciousness:  Alert      Response to Learning: Able to verbalize current knowledge/experience      Endings: None Reported    Modes of Intervention: Education      Discipline Responsible: /Counselor      Signature:   SAHRA Leiva

## 2017-11-14 NOTE — BH NOTE
Pt did not attend Wellness group at 1630 or Recreation group at 1900.     Electronically signed by Timm Soulier on 11/13/2017 at 11:06 PM

## 2017-11-14 NOTE — PROGRESS NOTES
[facility] FOLLOW-UP NOTE     11/14/2017     Patient was seen and examined in person, Chart reviewed   Patient's case discussed with staff/team    Chief Complaint: depression, SI, addiction    Interim History:     Pt is c/o nausea and dry heaving  Could not sleep last night  Still feeling depressed and hopeless  Passive SI, no active plans  Anxious ++   GF visited him yesterday    Appetite:   [x] Normal/Unchanged  [] Increased  [] Decreased      Sleep:       [] Normal/Unchanged  [] Fair       [x] Poor              Energy:    [] Normal/Unchanged  [] Increased  [] Decreased        SI [x] passive Present  [] Absent    HI  []Present  [x] Absent     Aggression:  [] yes  [x] no    Patient is [] able  [x] unable to CONTRACT FOR SAFETY     PAST MEDICAL/PSYCHIATRIC HISTORY:   Past Medical History:   Diagnosis Date    Anxiety     Bilateral carpal tunnel syndrome     Bipolar disorder with moderate depression ([facility] [address] 10/12/2015    BP (high blood pressure)     Depression     Diacetylmorphine overdose 2/26/2017    Hepatitis C virus infection     Hyperthyroidism     Hypothyroidism 3/7/2017    Hypothyroidism (acquired) 6/23/2015    Steatohepatitis        FAMILY/SOCIAL HISTORY:  Family History   Problem Relation Age of Onset    Depression Mother     Depression Father     Depression Sister     Depression Brother     Cancer Maternal Grandfather     Heart Disease Paternal Grandmother     High Blood Pressure Paternal Grandmother     Other Paternal Grandfather      Social History     Social History    Marital status: Single     Spouse name: N/A    Number of children: N/A    Years of education: N/A     Occupational History    Not on file.      Social History Main Topics    Smoking status: Current Every Day Smoker     Packs/day: 1.00     Years: 15.00     Types: Cigarettes    Smokeless tobacco: Never Used    Alcohol use Yes      Comment: patient admits to being sober for a year but relapsing recently and having 10 [place]     Drug use:      Types: Opiates       Comment: 3 days ago    Sexual activity: Yes     Partners: Female     Other Topics Concern    Not on file     Social History Narrative    No narrative on file           ROS:  [x] All negative/unchanged except if checked.  Explain positive(checked items) below:  [] Constitutional  [] Eyes  [] Ear/Nose/Mouth/Throat  [] Respiratory  [] CV  [] GI  []   [] Musculoskeletal  [] Skin/Breast  [] Neurological  [] Endocrine  [] Heme/Lymph  [] Allergic/Immunologic    Explanation:     MEDICATIONS:    Current Facility-Administered Medications:     DULoxetine (CYMBALTA) extended release capsule 30 mg, 30 mg, Oral, Daily, Ford Mak MD, 30 mg at 11/14/17 0853    lurasidone (LATUDA) tablet 20 mg, 20 mg, Oral, Daily, Ford Mak MD, 20 mg at 11/14/17 0853    neomycin-bacitracin-polymyxin (NEOSPORIN) ointment, , Topical, BID, Jose Fulling, CNP, 3.5 g at 11/14/17 0853    acetaminophen (TYLENOL) tablet 650 mg, 650 mg, Oral, Q4H PRN, Ford Mak MD, 650 mg at 11/13/17 1936    hydrOXYzine (VISTARIL) capsule 50 mg, 50 mg, Oral, Q6H PRN, Ford Mak MD, 50 mg at 11/14/17 0927    haloperidol lactate (HALDOL) injection 5 mg, 5 mg, Intramuscular, Q4H PRN, Ford Mak MD    traZODone (DESYREL) tablet 50 mg, 50 mg, Oral, Nightly PRN, Ford Mak MD, 50 mg at 11/13/17 2123    benztropine mesylate (COGENTIN) injection 2 mg, 2 mg, Intramuscular, BID PRN, Ford Mak MD    magnesium hydroxide (MILK OF MAGNESIA) 400 MG/5ML suspension 30 mL, 30 mL, Oral, Daily PRN, Ford Mak MD    aluminum & magnesium hydroxide-simethicone (MAALOX) 200-200-20 MG/5ML suspension 30 mL, 30 mL, Oral, PRN, Ford Mak MD    nicotine (NICODERM CQ) 21 MG/24HR 1 patch, 1 patch, Transdermal, Daily, Ford Mak MD, 1 patch at 11/14/17 0853    levothyroxine (SYNTHROID) tablet 200 mcg, 200 mcg, Oral, Daily, Ford Mak MD, 200 mcg at dependence with withdrawal ([facility] [address]      LABS:    Recent Labs      11/12/17   0140   WBC  10.1   HGB  17.0   PLT  248     Recent Labs      11/11/17   2345   NA  145*   K  4.4   CL  107   CO2  23   BUN  8   CREATININE  0.70   GLUCOSE  98     Recent Labs      11/11/17   2345   BILITOT  0.3   ALKPHOS  82   AST  80*   ALT  102*     Lab Results   Component Value Date    LABAMPH Neg 11/12/2017    BARBSCNU Neg 11/12/2017    LABBENZ Neg 11/12/2017    LABMETH Neg 11/12/2017    OPIATESCREENURINE Neg 11/12/2017    PHENCYCLIDINESCREENURINE Neg 11/12/2017    ETOH 107 11/12/2017     Lab Results   Component Value Date    TSH 8.360 11/11/2017     No results found for: LITHIUM  No results found for: VALPROATE, CBMZ    RISK ASSESSMENT: high suicide risk    Treatment Plan:  Reviewed current Medications with the patient. Alcohol withdrawal - continue librium   ANxiety- vistaril PRN and neurontin started  Nausea- zofran started  Bipolar depression- latuda 20 mg po q daily  Risks, benefits, side effects, drug-to-drug interactions and alternatives to treatment were discussed. Collateral information: pending  CD evaluation  Encourage patient to attend group and other milieu activities.   Discharge planning discussed with the patient and treatment team.    PSYCHOTHERAPY/COUNSELING:  [x] Therapeutic interview  [x] Supportive  [] CBT  [] Ongoing  [] Other    [x] Patient continues to need, on a daily basis, active treatment furnished directly by or requiring the supervision of inpatient psychiatric personnel      Anticipated Length of stay:            Electronically signed by Debbie Murcia MD on 11/14/2017 at 11:13 AM

## 2017-11-14 NOTE — PROGRESS NOTES
Group Therapy Note    Date: 11/14/2017  Start Time: 1100  End Time:  9095  Number of Participants: 7    Type of Group: Psychoeducation    Wellness Binder Information  Module Name:    Session Number:      Patient's Goal:  \"To feel better\"    Notes:  Patient work actively on his project and he was more relaxed in group. Status After Intervention:  Unchanged    Participation Level:  Active Listener    Participation Quality: Appropriate and Attentive      Speech:  normal      Thought Process/Content: Linear      Affective Functioning: Congruent      Mood: calm      Level of consciousness:  Alert and Oriented x4      Response to Learning: Able to verbalize current knowledge/experience      Endings: None Reported    Modes of Intervention: Education, Socialization and Activity      Discipline Responsible: Psychoeducational Specialist      Signature:  Cassidy Tyler

## 2017-11-14 NOTE — PROGRESS NOTES
FAMILY COLLATERAL NOTE    Family/Support Name: Uriel Harris  Contact #: [phone]  Relationship to Pt: mother      Placed call to above. Response: Left message for Holger Moss to call back for collateral information.          Lindsey Elizondo

## 2017-11-14 NOTE — PROGRESS NOTES
Pt out on the unit, ate lunch denies N&V, slight HA, social with peers during lunch. Pt back to room at this time. Electronically signed by Vianey Simeon RN on 11/14/2017 at 12:34 PM

## 2017-11-15 PROCEDURE — 6370000000 HC RX 637 (ALT 250 FOR IP): Performed by: PSYCHIATRY & NEUROLOGY

## 2017-11-15 PROCEDURE — 1240000000 HC EMOTIONAL WELLNESS R&B

## 2017-11-15 PROCEDURE — 6370000000 HC RX 637 (ALT 250 FOR IP): Performed by: PHYSICIAN ASSISTANT

## 2017-11-15 PROCEDURE — 6370000000 HC RX 637 (ALT 250 FOR IP): Performed by: NURSE PRACTITIONER

## 2017-11-15 PROCEDURE — 99232 SBSQ HOSP IP/OBS MODERATE 35: CPT | Performed by: PSYCHIATRY & NEUROLOGY

## 2017-11-15 RX ORDER — HALOPERIDOL 5 MG
5 TABLET ORAL EVERY 4 HOURS PRN
Status: DISCONTINUED | OUTPATIENT
Start: 2017-11-15 | End: 2017-11-19 | Stop reason: HOSPADM

## 2017-11-15 RX ORDER — TRAZODONE HYDROCHLORIDE 100 MG/1
100 TABLET ORAL NIGHTLY PRN
Status: DISCONTINUED | OUTPATIENT
Start: 2017-11-15 | End: 2017-11-16

## 2017-11-15 RX ORDER — GABAPENTIN 100 MG/1
200 CAPSULE ORAL 3 TIMES DAILY
Status: DISCONTINUED | OUTPATIENT
Start: 2017-11-15 | End: 2017-11-19 | Stop reason: HOSPADM

## 2017-11-15 RX ORDER — HALOPERIDOL 5 MG/ML
5 INJECTION INTRAMUSCULAR EVERY 4 HOURS PRN
Status: DISCONTINUED | OUTPATIENT
Start: 2017-11-15 | End: 2017-11-19 | Stop reason: HOSPADM

## 2017-11-15 RX ADMIN — GABAPENTIN 200 MG: 100 CAPSULE ORAL at 20:01

## 2017-11-15 RX ADMIN — IBUPROFEN 600 MG: 600 TABLET, FILM COATED ORAL at 18:20

## 2017-11-15 RX ADMIN — TRAZODONE HYDROCHLORIDE 100 MG: 100 TABLET ORAL at 20:00

## 2017-11-15 RX ADMIN — IBUPROFEN 600 MG: 600 TABLET, FILM COATED ORAL at 11:55

## 2017-11-15 RX ADMIN — THERA TABS 1 TABLET: TAB at 08:47

## 2017-11-15 RX ADMIN — HYDROXYZINE PAMOATE 50 MG: 50 CAPSULE ORAL at 11:55

## 2017-11-15 RX ADMIN — GABAPENTIN 200 MG: 100 CAPSULE ORAL at 13:21

## 2017-11-15 RX ADMIN — HYDROXYZINE PAMOATE 50 MG: 50 CAPSULE ORAL at 18:20

## 2017-11-15 RX ADMIN — BACITRACIN ZINC, NEOMYCIN SULFATE, POLYMYXIN B SULFATE 3.5 G: 3.5; 5000; 4 OINTMENT TOPICAL at 11:55

## 2017-11-15 RX ADMIN — LURASIDONE HYDROCHLORIDE 20 MG: 20 TABLET, FILM COATED ORAL at 08:47

## 2017-11-15 RX ADMIN — HALOPERIDOL 5 MG: 5 TABLET ORAL at 20:43

## 2017-11-15 RX ADMIN — DULOXETINE HYDROCHLORIDE 30 MG: 30 CAPSULE, DELAYED RELEASE ORAL at 08:47

## 2017-11-15 RX ADMIN — BACITRACIN ZINC, NEOMYCIN SULFATE, POLYMYXIN B SULFATE 3.5 G: 3.5; 5000; 4 OINTMENT TOPICAL at 20:00

## 2017-11-15 RX ADMIN — FOLIC ACID 1 MG: 1 TABLET ORAL at 08:47

## 2017-11-15 RX ADMIN — Medication 100 MG: at 08:47

## 2017-11-15 RX ADMIN — LEVOTHYROXINE SODIUM 200 MCG: 88 TABLET ORAL at 06:06

## 2017-11-15 RX ADMIN — HYDROXYZINE PAMOATE 50 MG: 50 CAPSULE ORAL at 05:27

## 2017-11-15 RX ADMIN — IBUPROFEN 600 MG: 600 TABLET, FILM COATED ORAL at 05:28

## 2017-11-15 RX ADMIN — GABAPENTIN 100 MG: 100 CAPSULE ORAL at 08:47

## 2017-11-15 ASSESSMENT — PAIN SCALES - GENERAL
PAINLEVEL_OUTOF10: 5
PAINLEVEL_OUTOF10: 5
PAINLEVEL_OUTOF10: 4

## 2017-11-15 NOTE — FLOWSHEET NOTE
Pt denies si, hi and avh. Feels detox sx are improving today, only feeling anxious and has slight headache. Out on the unit, attending groups. Brightened affect, reactive. Concerned with work, needs verification sent that he is here.     Electronically signed by Dee Lemons RN on 11/15/2017 at 1:16 PM

## 2017-11-15 NOTE — PROGRESS NOTES
Pt. attended the 0900 community meeting. Electronically signed by James Dwyer, [address] on 11/15/2017 at 10:44 AM

## 2017-11-15 NOTE — PROGRESS NOTES
Group Therapy Note    Date: 11/15/2017  Start Time: 10am  End Time:  11am  Number of Participants: 4    Type of Group: Psychotherapy    Wellness Binder Information  Module Name:  psychotherapy  Session Number:      Patient's Goal:  Improve coping, gain insight    Notes:  Talked about concerns with anxiety, has worry and anxiety about work and children who live out of state. Talked about difficulties with sleep    Status After Intervention:  Improved    Participation Level:  Active Listener and Interactive    Participation Quality: Appropriate, Attentive, Sharing and Supportive      Speech:  normal      Thought Process/Content: Logical      Affective Functioning: Congruent      Mood: anxious and depressed      Level of consciousness:  Alert, Oriented x4 and Attentive      Response to Learning: Able to verbalize current knowledge/experience, Able to verbalize/acknowledge new learning and Able to retain information      Endings: None Reported    Modes of Intervention: Education, Support, Socialization, Exploration and Clarifying      Discipline Responsible: /Counselor      Signature:  Mauri Yanes

## 2017-11-15 NOTE — PROGRESS NOTES
having 10 beers tonight     Drug use:      Types: Opiates       Comment: 3 days ago    Sexual activity: Yes     Partners: Female     Other Topics Concern    Not on file     Social History Narrative    No narrative on file           ROS:  [x] All negative/unchanged except if checked.  Explain positive(checked items) below:  [] Constitutional  [] Eyes  [] Ear/Nose/Mouth/Throat  [] Respiratory  [] CV  [] GI  []   [] Musculoskeletal  [] Skin/Breast  [] Neurological  [] Endocrine  [] Heme/Lymph  [] Allergic/Immunologic    Explanation:     MEDICATIONS:    Current Facility-Administered Medications:     ondansetron (ZOFRAN) tablet 4 mg, 4 mg, Oral, Q8H PRN, Flor Lamas MD, 4 mg at 11/14/17 1214    gabapentin (NEURONTIN) capsule 100 mg, 100 mg, Oral, TID, Flor Lamas MD, 100 mg at 11/15/17 0847    DULoxetine (CYMBALTA) extended release capsule 30 mg, 30 mg, Oral, Daily, Flor Lamas MD, 30 mg at 11/15/17 0847    lurasidone (LATUDA) tablet 20 mg, 20 mg, Oral, Daily, Flor Lamas MD, 20 mg at 11/15/17 0847    neomycin-bacitracin-polymyxin (NEOSPORIN) ointment, , Topical, BID, [place] Yoni, CNP    acetaminophen (TYLENOL) tablet 650 mg, 650 mg, Oral, Q4H PRN, Flor Lamas MD, 650 mg at 11/14/17 1717    hydrOXYzine (VISTARIL) capsule 50 mg, 50 mg, Oral, Q6H PRN, Flor Lamas MD, 50 mg at 11/15/17 0527    haloperidol lactate (HALDOL) injection 5 mg, 5 mg, Intramuscular, Q4H PRN, Flor Lamas MD    traZODone (DESYREL) tablet 50 mg, 50 mg, Oral, Nightly PRN, Flor Lamas MD, 50 mg at 11/14/17 7870    benztropine mesylate (COGENTIN) injection 2 mg, 2 mg, Intramuscular, BID PRN, Flor Lamas MD    magnesium hydroxide (MILK OF MAGNESIA) 400 MG/5ML suspension 30 mL, 30 mL, Oral, Daily PRN, Flor Lamas MD    aluminum & magnesium hydroxide-simethicone (MAALOX) 200-200-20 MG/5ML suspension 30 mL, 30 mL, Oral, PRN, Flor Lamas MD    nicotine (Rakesh Wang) 21 MG/24HR 1 patch, 1 patch, Transdermal, Daily, Lola Bennett MD, 1 patch at 11/15/17 0851    levothyroxine (SYNTHROID) tablet 200 mcg, 200 mcg, Oral, Daily, Lola Bennett MD, 200 mcg at 11/15/17 0606    vitamin B-1 (THIAMINE) tablet 100 mg, 100 mg, Oral, Daily, Lola Bennett MD, 100 mg at 26/19/65 3387    folic acid (FOLVITE) tablet 1 mg, 1 mg, Oral, Daily, Lola Bennett MD, 1 mg at 11/15/17 0847    multivitamin 1 tablet, 1 tablet, Oral, Daily, Lola Bennett MD, 1 tablet at 11/15/17 0847    chlordiazePOXIDE (LIBRIUM) capsule 50 mg, 50 mg, Oral, Q2H PRN, DINA Quiñones    chlordiazePOXIDE (LIBRIUM) capsule 75 mg, 75 mg, Oral, Q1H PRN, DINA Quiñones    chlordiazePOXIDE (LIBRIUM) capsule 10 mg, 10 mg, Oral, Q4H PRN, DINA Patel, 10 mg at 11/14/17 0228    chlordiazePOXIDE (LIBRIUM) capsule 25 mg, 25 mg, Oral, Q4H PRN, DINA Patel, 25 mg at 11/14/17 1708    LORazepam (ATIVAN) tablet 4 mg, 4 mg, Oral, Q1H PRN, DINA Quiñones    ibuprofen (ADVIL;MOTRIN) tablet 600 mg, 600 mg, Oral, Q6H PRN, DINA Patel, 600 mg at 11/15/17 8087      Examination:  /88   Pulse 81   Temp 97 °F (36.1 °C) (Oral)   Resp 20   Ht 5' 11\" (1.803 m)   Wt 203 lb (92.1 kg)   SpO2 99%   BMI 28.31 kg/m²   Gait - steady  Medication side effects(SE): no    Mental Status Examination:    Level of consciousness:  within normal limits   Appearance:  fair grooming and fair hygiene  Behavior/Motor:  psychomotor retardation  Attitude toward examiner:  attentive  Speech:  slow   Mood: decreased range and depressed  Affect:  blunted  Thought processes:  coherent   Thought content:  Suicidal Ideation:  passive  Delusions:  no evidence of delusions  Perceptual Disturbance:  denies any perceptual disturbance  Cognition:  oriented to person, place, and time   Concentration distractible  Insight fair   Judgement fair     ASSESSMENT:   Patient symptoms are:  [] Well controlled  [] Improving  [] Worsening  [x] No change      Diagnosis:   Principal Problem:    Bipolar 2 disorder, major depressive episode ([facility] [address]  Active Problems:    Alcohol dependence with withdrawal (H[facility])      LABS:    No results for input(s): WBC, HGB, PLT in the last 72 hours. No results for input(s): NA, K, CL, CO2, BUN, CREATININE, GLUCOSE in the last 72 hours. No results for input(s): BILITOT, ALKPHOS, AST, ALT in the last 72 hours. Lab Results   Component Value Date    LABAMPH Neg 11/12/2017    BARBSCNU Neg 11/12/2017    LABBENZ Neg 11/12/2017    LABMETH Neg 11/12/2017    OPIATESCREENURINE Neg 11/12/2017    PHENCYCLIDINESCREENURINE Neg 11/12/2017    ETOH 107 11/12/2017     Lab Results   Component Value Date    TSH 8.360 11/11/2017     No results found for: LITHIUM  No results found for: VALPROATE, CBMZ    RISK ASSESSMENT: high suicide risk    Treatment Plan:  Reviewed current Medications with the patient. Alcohol withdrawal - continue librium and ciwa protocol  ANxiety- increase neurontin 200 mg po tid  Bipolar depression- increase latuda 40 mg po q daily  Increase trazodone for sleep  Risks, benefits, side effects, drug-to-drug interactions and alternatives to treatment were discussed. Collateral information: pending  CD evaluation  Encourage patient to attend group and other milieu activities.   Discharge planning discussed with the patient and treatment team.    PSYCHOTHERAPY/COUNSELING:  [x] Therapeutic interview  [x] Supportive  [] CBT  [] Ongoing  [] Other    [x] Patient continues to need, on a daily basis, active treatment furnished directly by or requiring the supervision of inpatient psychiatric personnel      Anticipated Length of stay:            Electronically signed by Kanu Guido MD on 11/15/2017 at 11:47 AM

## 2017-11-16 PROCEDURE — 6370000000 HC RX 637 (ALT 250 FOR IP): Performed by: PSYCHIATRY & NEUROLOGY

## 2017-11-16 PROCEDURE — 1240000000 HC EMOTIONAL WELLNESS R&B

## 2017-11-16 PROCEDURE — 99232 SBSQ HOSP IP/OBS MODERATE 35: CPT | Performed by: PSYCHIATRY & NEUROLOGY

## 2017-11-16 PROCEDURE — 6360000002 HC RX W HCPCS: Performed by: PSYCHIATRY & NEUROLOGY

## 2017-11-16 PROCEDURE — 6370000000 HC RX 637 (ALT 250 FOR IP): Performed by: PHYSICIAN ASSISTANT

## 2017-11-16 PROCEDURE — 6370000000 HC RX 637 (ALT 250 FOR IP): Performed by: NURSE PRACTITIONER

## 2017-11-16 RX ORDER — TRAZODONE HYDROCHLORIDE 150 MG/1
150 TABLET ORAL NIGHTLY PRN
Status: DISCONTINUED | OUTPATIENT
Start: 2017-11-16 | End: 2017-11-19 | Stop reason: HOSPADM

## 2017-11-16 RX ORDER — DIVALPROEX SODIUM 500 MG/1
500 TABLET, DELAYED RELEASE ORAL EVERY 12 HOURS SCHEDULED
Status: DISCONTINUED | OUTPATIENT
Start: 2017-11-16 | End: 2017-11-19 | Stop reason: HOSPADM

## 2017-11-16 RX ADMIN — HYDROXYZINE PAMOATE 50 MG: 50 CAPSULE ORAL at 23:10

## 2017-11-16 RX ADMIN — LURASIDONE HYDROCHLORIDE 40 MG: 40 TABLET, FILM COATED ORAL at 08:43

## 2017-11-16 RX ADMIN — HYDROXYZINE PAMOATE 50 MG: 50 CAPSULE ORAL at 16:17

## 2017-11-16 RX ADMIN — GABAPENTIN 200 MG: 100 CAPSULE ORAL at 13:56

## 2017-11-16 RX ADMIN — HYDROXYZINE PAMOATE 50 MG: 50 CAPSULE ORAL at 02:10

## 2017-11-16 RX ADMIN — IBUPROFEN 600 MG: 600 TABLET, FILM COATED ORAL at 16:18

## 2017-11-16 RX ADMIN — HALOPERIDOL 5 MG: 5 TABLET ORAL at 12:54

## 2017-11-16 RX ADMIN — THERA TABS 1 TABLET: TAB at 08:43

## 2017-11-16 RX ADMIN — GABAPENTIN 200 MG: 100 CAPSULE ORAL at 21:46

## 2017-11-16 RX ADMIN — DULOXETINE HYDROCHLORIDE 30 MG: 30 CAPSULE, DELAYED RELEASE ORAL at 08:43

## 2017-11-16 RX ADMIN — BENZTROPINE MESYLATE 2 MG: 1 INJECTION INTRAMUSCULAR; INTRAVENOUS at 21:09

## 2017-11-16 RX ADMIN — BACITRACIN ZINC, NEOMYCIN SULFATE, POLYMYXIN B SULFATE 3.5 G: 3.5; 5000; 4 OINTMENT TOPICAL at 21:47

## 2017-11-16 RX ADMIN — HYDROXYZINE PAMOATE 50 MG: 50 CAPSULE ORAL at 08:43

## 2017-11-16 RX ADMIN — FOLIC ACID 1 MG: 1 TABLET ORAL at 08:43

## 2017-11-16 RX ADMIN — Medication 100 MG: at 08:43

## 2017-11-16 RX ADMIN — BACITRACIN ZINC, NEOMYCIN SULFATE, POLYMYXIN B SULFATE 3.5 G: 3.5; 5000; 4 OINTMENT TOPICAL at 08:42

## 2017-11-16 RX ADMIN — GABAPENTIN 200 MG: 100 CAPSULE ORAL at 08:43

## 2017-11-16 RX ADMIN — DIVALPROEX SODIUM 500 MG: 500 TABLET, DELAYED RELEASE ORAL at 21:46

## 2017-11-16 RX ADMIN — IBUPROFEN 600 MG: 600 TABLET, FILM COATED ORAL at 06:33

## 2017-11-16 RX ADMIN — IBUPROFEN 600 MG: 600 TABLET, FILM COATED ORAL at 23:10

## 2017-11-16 RX ADMIN — ACETAMINOPHEN 650 MG: 325 TABLET ORAL at 18:21

## 2017-11-16 RX ADMIN — LEVOTHYROXINE SODIUM 200 MCG: 88 TABLET ORAL at 06:24

## 2017-11-16 ASSESSMENT — PAIN SCALES - GENERAL
PAINLEVEL_OUTOF10: 4
PAINLEVEL_OUTOF10: 4
PAINLEVEL_OUTOF10: 5

## 2017-11-16 NOTE — PROGRESS NOTES
Pt complained of feeling anxious today and became upset with a peer who was speaking improperly to his female peers .  Pt had visteral earlier for anxiety so ws given haldol 5 mg po for extreme anxiety when  he was challenged and yelled at by this male peer pt was able to maintain control and listened to redirection by staff

## 2017-11-16 NOTE — BH NOTE
Group Therapy Note    Date: 11/15/2017  Start Time: 8965  End Time:  8343  Number of Participants: 9    Type of Group: Healthy Living/Wellness    Notes:  Patient participated appropriately in breathing meditation exercise. Status After Intervention:  Unchanged    Participation Level:  Active Listener    Participation Quality: Attentive and Sharing      Speech:  normal      Thought Process/Content: Linear      Affective Functioning: Congruent      Mood: euthymic      Level of consciousness:  Alert and Oriented x4      Response to Learning: Able to verbalize current knowledge/experience      Endings: None Reported    Modes of Intervention: Support and Socialization      Discipline Responsible: Behavorial Health Tech      Signature:  Vicente Mckeon  Electronically signed by Vicente Mckeon on 11/15/2017 at 10:58 PM

## 2017-11-16 NOTE — PROGRESS NOTES
Pt c/o of increased anxiety, restlessness, and feeling \"panicky\", Vistaril was given an hour ago and not helping, pt offered and accepted Haldol 5mg po.  Electronically signed by Cynthia Mcdermott RN on 11/15/17 at 7:49 PM

## 2017-11-16 NOTE — PROGRESS NOTES
Group Therapy Note    Date: 11/16/2017  Start Time: 1100  End Time:  1662  Number of Participants: 10    Type of Group: Psychoeducation    Wellness Binder Information  Module Name:    Session Number:      Patient's Goal:  \"To get some sleep\"    Notes:  Patient was attentive, motivated and work well on his project. Status After Intervention:  Improved    Participation Level:  Active Listener and Interactive    Participation Quality: Appropriate and Attentive      Speech:  normal      Thought Process/Content: Logical  Linear      Affective Functioning: Congruent      Mood: calm      Level of consciousness:  Alert, Oriented x4 and Attentive      Response to Learning: Able to verbalize current knowledge/experience      Endings: None Reported    Modes of Intervention: Education, Socialization and Activity      Discipline Responsible: Psychoeducational Specialist      Signature:  James Dwyer

## 2017-11-16 NOTE — PROGRESS NOTES
[facility] FOLLOW-UP NOTE     11/16/2017     Patient was seen and examined in person, Chart reviewed   Patient's case discussed with staff/team    Chief Complaint: depression, SI, addiction    Interim History:     Pt continue to have racing thoughts  Anger irritable and impulsive  Pt did not sleep well  Pt denies any active SI  Very anxious and restless  Distractible ++  Appetite:   [x] Normal/Unchanged  [] Increased  [] Decreased      Sleep:       [] Normal/Unchanged  [] Fair       [x] Poor              Energy:    [] Normal/Unchanged  [] Increased  [] Decreased        SI [x] passive Present  [] Absent    HI  []Present  [x] Absent     Aggression:  [] yes  [x] no    Patient is [] able  [x] unable to CONTRACT FOR SAFETY     PAST MEDICAL/PSYCHIATRIC HISTORY:   Past Medical History:   Diagnosis Date    Anxiety     Bilateral carpal tunnel syndrome     Bipolar disorder with moderate depression ([facility] [address] 10/12/2015    BP (high blood pressure)     Depression     Diacetylmorphine overdose 2/26/2017    Hepatitis C virus infection     Hyperthyroidism     Hypothyroidism 3/7/2017    Hypothyroidism (acquired) 6/23/2015    Steatohepatitis        FAMILY/SOCIAL HISTORY:  Family History   Problem Relation Age of Onset    Depression Mother     Depression Father     Depression Sister     Depression Brother     Cancer Maternal Grandfather     Heart Disease Paternal Grandmother     High Blood Pressure Paternal Grandmother     Other Paternal Grandfather      Social History     Social History    Marital status: Single     Spouse name: N/A    Number of children: N/A    Years of education: N/A     Occupational History    Not on file.      Social History Main Topics    Smoking status: Current Every Day Smoker     Packs/day: 1.00     Years: 15.00     Types: Cigarettes    Smokeless tobacco: Never Used    Alcohol use Yes      Comment: patient admits to being sober for a year but relapsing recently and having 10 beers CO2, BUN, CREATININE, GLUCOSE in the last 72 hours. No results for input(s): BILITOT, ALKPHOS, AST, ALT in the last 72 hours. Lab Results   Component Value Date    LABAMPH Neg 11/12/2017    BARBSCNU Neg 11/12/2017    LABBENZ Neg 11/12/2017    LABMETH Neg 11/12/2017    OPIATESCREENURINE Neg 11/12/2017    PHENCYCLIDINESCREENURINE Neg 11/12/2017    ETOH 107 11/12/2017     Lab Results   Component Value Date    TSH 8.360 11/11/2017     No results found for: LITHIUM  No results found for: VALPROATE, CBMZ    RISK ASSESSMENT: high suicide risk    Treatment Plan:  Reviewed current Medications with the patient. Star depakote 500 mg po bid  D/C cymbalta  Bipolar depression- increase latuda 40 mg po q daily  Increase trazodone for sleep as ordered  Risks, benefits, side effects, drug-to-drug interactions and alternatives to treatment were discussed. Collateral information: pending  CD evaluation  Encourage patient to attend group and other milieu activities.   Discharge planning discussed with the patient and treatment team.    PSYCHOTHERAPY/COUNSELING:  [x] Therapeutic interview  [x] Supportive  [] CBT  [] Ongoing  [] Other    [x] Patient continues to need, on a daily basis, active treatment furnished directly by or requiring the supervision of inpatient psychiatric personnel      Anticipated Length of stay:            Electronically signed by Regine Balderas MD on 11/16/2017 at 11:55 AM

## 2017-11-16 NOTE — PLAN OF CARE
Problem: Altered Mood, Depressive Behavior  Intervention: Depressive symptoms assessment-behavioral health  Pt denies feeling depressed   Intervention: Education, Medication-behavioral health  Ongoing     Goal: LTG-Able to verbalize acceptance of life and situations over which he or she has no control  Outcome: Ongoing    Goal: LTG-Able to verbalize and/or display a decrease in depressive symptoms  Outcome: Met This Shift    Goal: STG-Absence of Self Harm  Outcome: Met This Shift    Goal: STG-Knowledge of positive coping patterns  Outcome: Met This Shift

## 2017-11-16 NOTE — PROGRESS NOTES
Out on unit, social, laughing, smiling and playing games with peers. States high anxiety? Prn vistaril this morning and haldol this afternoon. Denies SI, depression or hallucinations. Cooperative, eating and going to groups.

## 2017-11-17 LAB — VALPROIC ACID LEVEL: 22.7 UG/ML (ref 50–100)

## 2017-11-17 PROCEDURE — 6370000000 HC RX 637 (ALT 250 FOR IP): Performed by: PSYCHIATRY & NEUROLOGY

## 2017-11-17 PROCEDURE — 6370000000 HC RX 637 (ALT 250 FOR IP): Performed by: NURSE PRACTITIONER

## 2017-11-17 PROCEDURE — 90833 PSYTX W PT W E/M 30 MIN: CPT | Performed by: PSYCHIATRY & NEUROLOGY

## 2017-11-17 PROCEDURE — 80164 ASSAY DIPROPYLACETIC ACD TOT: CPT

## 2017-11-17 PROCEDURE — 36415 COLL VENOUS BLD VENIPUNCTURE: CPT

## 2017-11-17 PROCEDURE — 99232 SBSQ HOSP IP/OBS MODERATE 35: CPT | Performed by: PSYCHIATRY & NEUROLOGY

## 2017-11-17 PROCEDURE — 1240000000 HC EMOTIONAL WELLNESS R&B

## 2017-11-17 PROCEDURE — 6370000000 HC RX 637 (ALT 250 FOR IP): Performed by: PHYSICIAN ASSISTANT

## 2017-11-17 RX ADMIN — DIVALPROEX SODIUM 500 MG: 500 TABLET, DELAYED RELEASE ORAL at 09:09

## 2017-11-17 RX ADMIN — GABAPENTIN 200 MG: 100 CAPSULE ORAL at 21:36

## 2017-11-17 RX ADMIN — HYDROXYZINE PAMOATE 50 MG: 50 CAPSULE ORAL at 15:39

## 2017-11-17 RX ADMIN — IBUPROFEN 600 MG: 600 TABLET, FILM COATED ORAL at 21:37

## 2017-11-17 RX ADMIN — HYDROXYZINE PAMOATE 50 MG: 50 CAPSULE ORAL at 09:08

## 2017-11-17 RX ADMIN — IBUPROFEN 600 MG: 600 TABLET, FILM COATED ORAL at 15:39

## 2017-11-17 RX ADMIN — LURASIDONE HYDROCHLORIDE 40 MG: 40 TABLET, FILM COATED ORAL at 09:09

## 2017-11-17 RX ADMIN — ACETAMINOPHEN 650 MG: 325 TABLET ORAL at 14:40

## 2017-11-17 RX ADMIN — HYDROXYZINE PAMOATE 50 MG: 50 CAPSULE ORAL at 21:36

## 2017-11-17 RX ADMIN — GABAPENTIN 200 MG: 100 CAPSULE ORAL at 09:09

## 2017-11-17 RX ADMIN — Medication 100 MG: at 09:09

## 2017-11-17 RX ADMIN — DIVALPROEX SODIUM 500 MG: 500 TABLET, DELAYED RELEASE ORAL at 21:37

## 2017-11-17 RX ADMIN — BACITRACIN ZINC, NEOMYCIN SULFATE, POLYMYXIN B SULFATE: 3.5; 5000; 4 OINTMENT TOPICAL at 21:36

## 2017-11-17 RX ADMIN — BACITRACIN ZINC, NEOMYCIN SULFATE, POLYMYXIN B SULFATE 3.5 G: 3.5; 5000; 4 OINTMENT TOPICAL at 09:11

## 2017-11-17 RX ADMIN — IBUPROFEN 600 MG: 600 TABLET, FILM COATED ORAL at 09:08

## 2017-11-17 RX ADMIN — FOLIC ACID 1 MG: 1 TABLET ORAL at 09:09

## 2017-11-17 RX ADMIN — GABAPENTIN 200 MG: 100 CAPSULE ORAL at 13:33

## 2017-11-17 RX ADMIN — LEVOTHYROXINE SODIUM 200 MCG: 88 TABLET ORAL at 06:13

## 2017-11-17 RX ADMIN — ACETAMINOPHEN 650 MG: 325 TABLET ORAL at 20:34

## 2017-11-17 RX ADMIN — TRAZODONE HYDROCHLORIDE 150 MG: 150 TABLET ORAL at 21:36

## 2017-11-17 RX ADMIN — THERA TABS 1 TABLET: TAB at 09:09

## 2017-11-17 ASSESSMENT — PAIN SCALES - GENERAL
PAINLEVEL_OUTOF10: 3
PAINLEVEL_OUTOF10: 3
PAINLEVEL_OUTOF10: 4
PAINLEVEL_OUTOF10: 5
PAINLEVEL_OUTOF10: 5

## 2017-11-17 NOTE — PROGRESS NOTES
Out on unit, social. Denies SI, depression or hallucinations. Pts only complaint is still having some anxiety.  Cooperative, eating, sleeping and social.

## 2017-11-17 NOTE — PROGRESS NOTES
Group Therapy Note    Date: 11/17/2017  Start Time: 1100  End Time:  5324  Number of Participants: 7    Type of Group: Psychoeducation    Wellness Binder Information  Module Name:    Session Number:      Patient's Goal:  \"to get some sleep\"    Notes:  Pt. attended the skill group. Worked adequately on project and was talkative and friendly. Engaged in group discussion. Helpful with other pts. Status After Intervention:  Improved    Participation Level:  Active Listener and Interactive    Participation Quality: Appropriate, Attentive and Sharing      Speech:  normal      Thought Process/Content: Logical      Affective Functioning: Flat      Mood: calm      Level of consciousness:  Alert, Oriented x4 and Attentive      Response to Learning: Progressing to goal      Endings: None Reported    Modes of Intervention: Education, Support, Socialization and Activity      Discipline Responsible: Psychoeducational Specialist      Signature:  Justine Mendoza

## 2017-11-17 NOTE — PROGRESS NOTES
Pt. declined to attend the 0900 community meeting, despite staff encouragement.  Electronically signed by Demetris Bee on 11/17/2017 at 9:54 AM

## 2017-11-17 NOTE — PLAN OF CARE
Problem: Altered Mood, Depressive Behavior  Intervention: Depressive symptoms assessment-behavioral health  Pt feels much less depressed   Intervention: Education, Medication-behavioral health  Ongoing   Intervention: Assist patient with identification of stressors in patient's life that precipitated current crisis  Ongoing   Intervention: Discharge safety plan  Will return home   Intervention: Medication intake supervison-behavioral health  Pt is cooperative with his meds and feels depakote has been helpful  Intervention: Support system assessment  Pt has family support  And his girlfriend     Goal: LTG-Able to verbalize acceptance of life and situations over which he or she has no control  Outcome: Met This Shift    Goal: LTG-Able to verbalize and/or display a decrease in depressive symptoms  Outcome: Met This Shift    Goal: STG-Absence of Self Harm  Outcome: Met This Shift    Goal: STG-Knowledge of positive coping patterns  Outcome: Met This Shift      Problem: Substance Abuse  Intervention: CIWA scale assessment  zero  Intervention: Nutritional intake assessment-behavioral health  Appetite has been very good   Intervention: Education, community resources  Ongoing   Intervention: Medication administration-behavioral health  Ongoing   Intervention: Support system assessment  Pt has family support  And his girlfriend     Goal: LTG-Absence of acute withdrawl symptoms  Outcome: Met This Shift    Goal: STG-Knowledge of community resources  Outcome: Met This Shift

## 2017-11-17 NOTE — BH NOTE
Group Therapy Note    Date: 11/17/2017  Start Time: 1330  End Time:  1430  Number of Participants: 11    Type of Group: Recovery    Wellness Binder Information  Module Name:  Stress and recovery  Session Number:  na    Patient's Goal:  Recognize stressors and recovery efforts. Notes:   Verbalized desire to get well, be sober and get through his struggles.       Status After Intervention:  Improved    Participation Level: Interactive    Participation Quality: Appropriate, Attentive, Sharing and Supportive      Speech:  normal      Thought Process/Content: Logical      Affective Functioning: Congruent      Mood: anxious      Level of consciousness:  Alert and Attentive      Response to Learning: Able to verbalize current knowledge/experience, Able to verbalize/acknowledge new learning, Able to retain information and Capable of insight      Endings: None Reported    Modes of Intervention: Education, Support and Socialization      Discipline Responsible: Registered Nurse      Signature:  Nikki Maldonado NP

## 2017-11-17 NOTE — PROGRESS NOTES
Patient denies SI, HI, and AVH. Patient denies depression but reports anxiety level still fluctuates but has overall improved. Patient bright and social with peers. Polite and cooperative with staff.  Electronically signed by Antonia Ramirez LPN on 47/17/6826 at 5:57 PM

## 2017-11-17 NOTE — PROGRESS NOTES
[facility] FOLLOW-UP NOTE     11/17/2017     Patient was seen and examined in person, Chart reviewed   Patient's case discussed with staff/team    Chief Complaint: depression, SI, addiction    Interim History:   Pt slept better last night  No racing thoughts  Less irritable  Pt happy with the progress  Pt tolerating the med changes    Appetite:   [x] Normal/Unchanged  [] Increased  [] Decreased      Sleep:       [] Normal/Unchanged  [x] Fair       [] Poor              Energy:    [x] Normal/Unchanged  [] Increased  [] Decreased        SI [] passive Present  [x] Absent    HI  []Present  [x] Absent     Aggression:  [] yes  [x] no    Patient is [] able  [x] unable to CONTRACT FOR SAFETY     PAST MEDICAL/PSYCHIATRIC HISTORY:   Past Medical History:   Diagnosis Date    Anxiety     Bilateral carpal tunnel syndrome     Bipolar disorder with moderate depression ([facility] 10/12/2015    BP (high blood pressure)     Depression     Diacetylmorphine overdose 2/26/2017    Hepatitis C virus infection     Hyperthyroidism     Hypothyroidism 3/7/2017    Hypothyroidism (acquired) 6/23/2015    Steatohepatitis        FAMILY/SOCIAL HISTORY:  Family History   Problem Relation Age of Onset    Depression Mother     Depression Father     Depression Sister     Depression Brother     Cancer Maternal Grandfather     Heart Disease Paternal Grandmother     High Blood Pressure Paternal Grandmother     Other Paternal Grandfather      Social History     Social History    Marital status: Single     Spouse name: N/A    Number of children: N/A    Years of education: N/A     Occupational History    Not on file.      Social History Main Topics    Smoking status: Current Every Day Smoker     Packs/day: 1.00     Years: 15.00     Types: Cigarettes    Smokeless tobacco: Never Used    Alcohol use Yes      Comment: patient admits to being sober for a year but relapsing recently and having 10 beers tonight     Drug use:      Types: PRN, Anushka Rose MD    nicotine (NICODERM CQ) 21 MG/24HR 1 patch, 1 patch, Transdermal, Daily, Anushka Rose MD, 1 patch at 11/17/17 0909    levothyroxine (SYNTHROID) tablet 200 mcg, 200 mcg, Oral, Daily, Anushka Rose MD, 200 mcg at 11/17/17 9307    vitamin B-1 (THIAMINE) tablet 100 mg, 100 mg, Oral, Daily, Anushka Rose MD, 100 mg at 65/31/51 9384    folic acid (FOLVITE) tablet 1 mg, 1 mg, Oral, Daily, Anushka Rose MD, 1 mg at 11/17/17 0909    multivitamin 1 tablet, 1 tablet, Oral, Daily, Anushka Rose MD, 1 tablet at 11/17/17 0909    LORazepam (ATIVAN) tablet 4 mg, 4 mg, Oral, Q1H PRN, DINA Whitt    ibuprofen (ADVIL;MOTRIN) tablet 600 mg, 600 mg, Oral, Q6H PRN, DINA Wright, 600 mg at 11/17/17 0908      Examination:  /89   Pulse 84   Temp 97 °F (36.1 °C) (Oral)   Resp 20   Ht 5' 11\" (1.803 m)   Wt 203 lb (92.1 kg)   SpO2 97%   BMI 28.31 kg/m²   Gait - steady  Medication side effects(SE): no    Mental Status Examination:    Level of consciousness:  within normal limits   Appearance:  fair grooming and fair hygiene  Behavior/Motor:  lWNL  Attitude toward examiner:  attentive  Speech:  slow   Mood: less depressed  Affect:  blunted  Thought processes:  coherent   Thought content:  Suicidal Ideation:  denies  Delusions:  no evidence of delusions  Perceptual Disturbance:  denies any perceptual disturbance  Cognition:  oriented to person, place, and time   Concentration distractible  Insight fair   Judgement fair     ASSESSMENT:   Patient symptoms are:  [] Well controlled  [x] Improving  [] Worsening  [] No change      Diagnosis:   Principal Problem:    Bipolar 2 disorder, [facility] ([facility]  Active Problems:    Alcohol dependence with withdrawal ([facility]      LABS:    No results for input(s): WBC, HGB, PLT in the last 72 hours. No results for input(s): NA, K, CL, CO2, BUN, CREATININE, GLUCOSE in the last 72 hours.   No results for input(s): BILITOT, ALKPHOS, AST, ALT in the last 72 hours. Lab Results   Component Value Date    LABAMPH Neg 11/12/2017    BARBSCNU Neg 11/12/2017    LABBENZ Neg 11/12/2017    LABMETH Neg 11/12/2017    OPIATESCREENURINE Neg 11/12/2017    PHENCYCLIDINESCREENURINE Neg 11/12/2017    ETOH 107 11/12/2017     Lab Results   Component Value Date    TSH 8.360 11/11/2017     No results found for: LITHIUM  No results found for: VALPROATE, CBMZ    RISK ASSESSMENT: high suicide risk    Treatment Plan:  Reviewed current Medications with the patient. Depakote level over the week end  D/C over the weekend if stable  Risks, benefits, side effects, drug-to-drug interactions and alternatives to treatment were discussed. Collateral information: pending  CD evaluation  Encourage patient to attend group and other milieu activities. Discharge planning discussed with the patient and treatment team.    PSYCHOTHERAPY/COUNSELING:  [x] Therapeutic interview  [x] Supportive  [] CBT  [] Ongoing  [] Other  Patient was seen 1:1 for 20 minutes, other than E&M time spent, focusing on      - coping skills techniques     - Anxiety management techniques discussed including deep breathing exercise and PMR     - discussing patients strength and weakness      - Motivational interviewing to assess the stage of change and assessing patient readiness to quit substance use.        [x] Patient continues to need, on a daily basis, active treatment furnished directly by or requiring the supervision of inpatient psychiatric personnel      Anticipated Length of stay:            Electronically signed by Marleni Small MD on 11/17/2017 at 10:45 AM

## 2017-11-18 PROCEDURE — 6360000002 HC RX W HCPCS: Performed by: PSYCHIATRY & NEUROLOGY

## 2017-11-18 PROCEDURE — 6370000000 HC RX 637 (ALT 250 FOR IP): Performed by: PHYSICIAN ASSISTANT

## 2017-11-18 PROCEDURE — 6370000000 HC RX 637 (ALT 250 FOR IP): Performed by: NURSE PRACTITIONER

## 2017-11-18 PROCEDURE — 6370000000 HC RX 637 (ALT 250 FOR IP): Performed by: PSYCHIATRY & NEUROLOGY

## 2017-11-18 PROCEDURE — 1240000000 HC EMOTIONAL WELLNESS R&B

## 2017-11-18 RX ADMIN — HYDROXYZINE PAMOATE 50 MG: 50 CAPSULE ORAL at 08:24

## 2017-11-18 RX ADMIN — HYDROXYZINE PAMOATE 50 MG: 50 CAPSULE ORAL at 16:01

## 2017-11-18 RX ADMIN — IBUPROFEN 600 MG: 600 TABLET, FILM COATED ORAL at 08:23

## 2017-11-18 RX ADMIN — HALOPERIDOL 5 MG: 5 TABLET ORAL at 18:19

## 2017-11-18 RX ADMIN — DIVALPROEX SODIUM 500 MG: 500 TABLET, DELAYED RELEASE ORAL at 08:23

## 2017-11-18 RX ADMIN — LEVOTHYROXINE SODIUM 200 MCG: 88 TABLET ORAL at 06:08

## 2017-11-18 RX ADMIN — ACETAMINOPHEN 650 MG: 325 TABLET ORAL at 14:27

## 2017-11-18 RX ADMIN — DIVALPROEX SODIUM 500 MG: 500 TABLET, DELAYED RELEASE ORAL at 20:28

## 2017-11-18 RX ADMIN — THERA TABS 1 TABLET: TAB at 08:23

## 2017-11-18 RX ADMIN — Medication 100 MG: at 08:23

## 2017-11-18 RX ADMIN — IBUPROFEN 600 MG: 600 TABLET, FILM COATED ORAL at 16:01

## 2017-11-18 RX ADMIN — ACETAMINOPHEN 650 MG: 325 TABLET ORAL at 20:28

## 2017-11-18 RX ADMIN — GABAPENTIN 200 MG: 100 CAPSULE ORAL at 20:28

## 2017-11-18 RX ADMIN — TRAZODONE HYDROCHLORIDE 150 MG: 150 TABLET ORAL at 20:28

## 2017-11-18 RX ADMIN — GABAPENTIN 200 MG: 100 CAPSULE ORAL at 14:14

## 2017-11-18 RX ADMIN — BENZTROPINE MESYLATE 2 MG: 1 INJECTION INTRAMUSCULAR; INTRAVENOUS at 18:19

## 2017-11-18 RX ADMIN — BACITRACIN ZINC, NEOMYCIN SULFATE, POLYMYXIN B SULFATE 3.5 G: 3.5; 5000; 4 OINTMENT TOPICAL at 08:22

## 2017-11-18 RX ADMIN — BACITRACIN ZINC, NEOMYCIN SULFATE, POLYMYXIN B SULFATE 3.5 G: 3.5; 5000; 4 OINTMENT TOPICAL at 20:28

## 2017-11-18 RX ADMIN — FOLIC ACID 1 MG: 1 TABLET ORAL at 08:24

## 2017-11-18 RX ADMIN — GABAPENTIN 200 MG: 100 CAPSULE ORAL at 08:23

## 2017-11-18 RX ADMIN — LURASIDONE HYDROCHLORIDE 40 MG: 40 TABLET, FILM COATED ORAL at 08:23

## 2017-11-18 ASSESSMENT — PAIN SCALES - GENERAL
PAINLEVEL_OUTOF10: 4
PAINLEVEL_OUTOF10: 4
PAINLEVEL_OUTOF10: 3
PAINLEVEL_OUTOF10: 3
PAINLEVEL_OUTOF10: 4

## 2017-11-18 ASSESSMENT — PAIN DESCRIPTION - ORIENTATION: ORIENTATION: LEFT

## 2017-11-18 ASSESSMENT — PAIN DESCRIPTION - LOCATION: LOCATION: WRIST

## 2017-11-18 NOTE — PROGRESS NOTES
Pt. attended the 0900 community meeting. Electronically signed by Chinedu July, [address] on 11/18/2017 at 11:42 AM

## 2017-11-18 NOTE — PROGRESS NOTES
Group Therapy Note    Date: 11/18/2017  Start Time: 1000  End Time:  1100  Number of Participants: 14    Type of Group: Psychoeducation    Wellness Binder Information  Module Name:    Session Number:      Patient's Goal:  \"To go home\"    Notes:  Patient had a brighter affect, enjoy socializing with his peers, work actively and independently on his project. Status After Intervention:  Improved    Participation Level: Active Listener and Interactive    Participation Quality: Appropriate, Attentive and Sharing      Speech:  normal      Thought Process/Content: Logical  Linear      Affective Functioning: Congruent      Mood: Brighter affect.       Level of consciousness:  Alert, Oriented x4 and Attentive      Response to Learning: Able to verbalize current knowledge/experience      Endings: None Reported    Modes of Intervention: Education, Socialization and Activity      Discipline Responsible: Psychoeducational Specialist      Signature:  James Dwyer

## 2017-11-18 NOTE — PLAN OF CARE
Problem: Altered Mood, Depressive Behavior  Goal: LTG-Able to verbalize acceptance of life and situations over which he or she has no control  Outcome: Completed Date Met: 11/17/17    Goal: LTG-Able to verbalize and/or display a decrease in depressive symptoms  Outcome: Completed Date Met: 11/17/17    Goal: STG-Absence of Self Harm  Outcome: Completed Date Met: 11/17/17    Goal: STG-Knowledge of positive coping patterns  Outcome: Completed Date Met: 11/17/17      Problem: Substance Abuse  Goal: LTG-Absence of acute withdrawl symptoms  Outcome: Completed Date Met: 11/17/17    Goal: STG-Knowledge of community resources  Outcome: Completed Date Met: 11/17/17

## 2017-11-19 VITALS
BODY MASS INDEX: 28.42 KG/M2 | HEART RATE: 89 BPM | OXYGEN SATURATION: 97 % | SYSTOLIC BLOOD PRESSURE: 121 MMHG | HEIGHT: 71 IN | TEMPERATURE: 97 F | DIASTOLIC BLOOD PRESSURE: 74 MMHG | RESPIRATION RATE: 20 BRPM | WEIGHT: 203 LBS

## 2017-11-19 PROCEDURE — 6370000000 HC RX 637 (ALT 250 FOR IP): Performed by: NURSE PRACTITIONER

## 2017-11-19 PROCEDURE — 6360000002 HC RX W HCPCS: Performed by: PSYCHIATRY & NEUROLOGY

## 2017-11-19 PROCEDURE — 6370000000 HC RX 637 (ALT 250 FOR IP): Performed by: PSYCHIATRY & NEUROLOGY

## 2017-11-19 PROCEDURE — 6370000000 HC RX 637 (ALT 250 FOR IP): Performed by: PHYSICIAN ASSISTANT

## 2017-11-19 PROCEDURE — G0008 ADMIN INFLUENZA VIRUS VAC: HCPCS | Performed by: PSYCHIATRY & NEUROLOGY

## 2017-11-19 PROCEDURE — 90656 IIV3 VACC NO PRSV 0.5 ML IM: CPT | Performed by: PSYCHIATRY & NEUROLOGY

## 2017-11-19 RX ORDER — DIVALPROEX SODIUM 500 MG/1
500 TABLET, DELAYED RELEASE ORAL EVERY 12 HOURS SCHEDULED
Qty: 90 TABLET | Refills: 3 | Status: SHIPPED | OUTPATIENT
Start: 2017-11-19 | End: 2017-11-19

## 2017-11-19 RX ORDER — TRAZODONE HYDROCHLORIDE 150 MG/1
150 TABLET ORAL NIGHTLY PRN
Qty: 14 TABLET | Refills: 1 | Status: SHIPPED | OUTPATIENT
Start: 2017-11-19 | End: 2018-07-31 | Stop reason: SDUPTHER

## 2017-11-19 RX ORDER — DIVALPROEX SODIUM 500 MG/1
500 TABLET, DELAYED RELEASE ORAL EVERY 12 HOURS SCHEDULED
Qty: 28 TABLET | Refills: 1 | Status: SHIPPED | OUTPATIENT
Start: 2017-11-19 | End: 2018-07-31

## 2017-11-19 RX ORDER — GABAPENTIN 100 MG/1
200 CAPSULE ORAL 3 TIMES DAILY
Qty: 60 CAPSULE | Refills: 1 | Status: SHIPPED | OUTPATIENT
Start: 2017-11-19 | End: 2018-07-31

## 2017-11-19 RX ORDER — GABAPENTIN 100 MG/1
200 CAPSULE ORAL 3 TIMES DAILY
Qty: 90 CAPSULE | Refills: 3 | Status: SHIPPED | OUTPATIENT
Start: 2017-11-19 | End: 2017-11-19

## 2017-11-19 RX ORDER — LEVOTHYROXINE SODIUM 0.2 MG/1
200 TABLET ORAL DAILY
Qty: 30 TABLET | Refills: 3 | Status: SHIPPED | OUTPATIENT
Start: 2017-11-20 | End: 2018-07-10 | Stop reason: SDUPTHER

## 2017-11-19 RX ORDER — HYDROXYZINE PAMOATE 50 MG/1
50 CAPSULE ORAL 2 TIMES DAILY PRN
Qty: 14 CAPSULE | Refills: 1 | Status: SHIPPED | OUTPATIENT
Start: 2017-11-19 | End: 2017-11-26

## 2017-11-19 RX ADMIN — FOLIC ACID 1 MG: 1 TABLET ORAL at 07:59

## 2017-11-19 RX ADMIN — BACITRACIN ZINC, NEOMYCIN SULFATE, POLYMYXIN B SULFATE 3.5 G: 3.5; 5000; 4 OINTMENT TOPICAL at 07:58

## 2017-11-19 RX ADMIN — HYDROXYZINE PAMOATE 50 MG: 50 CAPSULE ORAL at 14:09

## 2017-11-19 RX ADMIN — ACETAMINOPHEN 650 MG: 325 TABLET ORAL at 11:50

## 2017-11-19 RX ADMIN — HYDROXYZINE PAMOATE 50 MG: 50 CAPSULE ORAL at 06:48

## 2017-11-19 RX ADMIN — Medication 100 MG: at 07:59

## 2017-11-19 RX ADMIN — IBUPROFEN 600 MG: 600 TABLET, FILM COATED ORAL at 14:09

## 2017-11-19 RX ADMIN — IBUPROFEN 600 MG: 600 TABLET, FILM COATED ORAL at 06:48

## 2017-11-19 RX ADMIN — THERA TABS 1 TABLET: TAB at 08:00

## 2017-11-19 RX ADMIN — LEVOTHYROXINE SODIUM 200 MCG: 88 TABLET ORAL at 05:57

## 2017-11-19 RX ADMIN — LURASIDONE HYDROCHLORIDE 40 MG: 40 TABLET, FILM COATED ORAL at 07:59

## 2017-11-19 RX ADMIN — DIVALPROEX SODIUM 500 MG: 500 TABLET, DELAYED RELEASE ORAL at 07:59

## 2017-11-19 RX ADMIN — GABAPENTIN 200 MG: 100 CAPSULE ORAL at 14:09

## 2017-11-19 RX ADMIN — GABAPENTIN 200 MG: 100 CAPSULE ORAL at 07:59

## 2017-11-19 RX ADMIN — A/SINGAPORE/GP1908/2015 IVR-180 (AN A/MICHIGAN/45/2015 (H1N1)PDM09-LIKE VIRUS, A/HONG KONG/4801/2014, NYMC X-263B (H3N2) (AN A/HONG KONG/4801/2014-LIKE VIRUS), AND B/BRISBANE/60/2008, WILD TYPE (A B/BRISBANE/60/2008-LIKE VIRUS) 0.5 ML: 15; 15; 15 INJECTION, SUSPENSION INTRAMUSCULAR at 08:01

## 2017-11-19 ASSESSMENT — PAIN SCALES - GENERAL
PAINLEVEL_OUTOF10: 3
PAINLEVEL_OUTOF10: 3
PAINLEVEL_OUTOF10: 6

## 2017-11-19 NOTE — PROGRESS NOTES
Assessment complete. Pt A/O X  3, denies all and reports depression of 0 with anxiety of 6 over going home today. He is brightened, social and cooperative. L wrist laceration is clean with no s/s infection. Sutures are clean and intact. Slept well through the night and ate breakfast this am.  Denies any needs at this time.

## 2017-11-19 NOTE — PROGRESS NOTES
Pt given all discharge information and instructions and able to verbalize an understanding of same. Pt denies any suicidal or homicidal ideations or hallucinations. Prescriptions printed and given to patient. Home belongings returned and food/drug information booklet given. Pt mom arrived to take pt home, pt taken to meet her at main entrance.         Electronically signed by Arnulfo Kaplan RN on 11/19/2017 at 4:18 PM

## 2017-11-20 ENCOUNTER — TELEPHONE (OUTPATIENT)
Dept: PRIMARY CARE CLINIC | Age: 32
End: 2017-11-20

## 2017-11-20 NOTE — PROGRESS NOTES
Discharge note  Late post:  Patient ws seen and interviewed  He reports improvement in his mood on a daily basis. PAtient feels much better after med changes done on the unit  cymbalta was stopped and latuda started  Dr. Stephani Haskins started gabapentin and depakote  Patient denies suicidal thoughts  No racing thoughhts appreciated  Denies auditory   Denies visual hallucinations  Mood stable. I also got collateral from mom who feels her son is stablw for discharge. Patient also had sutures removed  followups are made. Dx: Bipolar disorder  Alcohol use disorder  . Manan Chappell    Patent re[pprts good mood  Good sleep  Increased energy  Denies suicidal thoughts  denies homicidal thoughts  No evidence of agitation  Affect euthymic  thoughts re organized  Speech normal volume  No psychosis  cogntion intact  Alert and oriented times three  Insight judgement good  Dx: bipolar disorder  . No current facility-administered medications for this encounter. Current Outpatient Prescriptions   Medication Sig Dispense Refill    hydrOXYzine (VISTARIL) 50 MG capsule Take 1 capsule by mouth 2 times daily as needed for Anxiety 14 capsule 1    traZODone (DESYREL) 150 MG tablet Take 1 tablet by mouth nightly as needed for Sleep 14 tablet 1    lurasidone (LATUDA) 40 MG TABS tablet Take 1 tablet by mouth daily 14 tablet 1    levothyroxine (SYNTHROID) 200 MCG tablet Take 1 tablet by mouth daily 30 tablet 3    divalproex (DEPAKOTE) 500 MG DR tablet Take 1 tablet by mouth every 12 hours 28 tablet 1    gabapentin (NEURONTIN) 100 MG capsule Take 2 capsules by mouth 3 times daily 60 capsule 1    SYNTHROID 200 MCG tablet Take 1 tablet by mouth daily 90 tablet 3     Discharge home w/meds and followup  . Manan Chappell md

## 2017-11-20 NOTE — TELEPHONE ENCOUNTER
Not Applicable   Questions addressed? Not Applicable                Has all of medication and/or prescriptions   Yes  Taking Medications? Yes  Can you swallow your pills? Yes    Is the patient having any trouble with ADL's or IADL's? No  Is the patient able to move around as expected? Yes  Needs Equipment?   No    Link to services in community?:  N/A   Which services:

## 2017-12-26 NOTE — DISCHARGE SUMMARY
Discharge summary:  Patient was admitted after cutting his wrist in a suicide attempt/  Stressors: relationship issues in the context of heavy alcohol use as well as untreated bipolar disorder. Patient reportedly drank 50 beers on the night of the suicide attempt. Patient was exhibit signs of alcohol withdrawal  Medications started on the unit: depakote and gabapentin/    MSE on admission:  Depressed mood , constricted affect  Low tone speech  Intact thought process  Suicidal thoughts were present. After initiation of medications and group/milieu therapeutic intervention patient was reporting improvement in mood and anxiety    MSE on discharge:  pleasant. Well groomed male  With euthymic mood and broadly mobilized affect  Speech was normal tone and volume  No evidence of thought process disorder  Patient denies suicidal or homicidal thoughts  No evidence of psychosis  Or confusion  Cognition intact     Anegly Green   [place] Medication Instructions [id number]:[id number]    Printed on:12/25/17 1629   Medication Information                      divalproex (DEPAKOTE) 500 MG DR tablet  Take 1 tablet by mouth every 12 hours             gabapentin (NEURONTIN) 100 MG capsule  Take 2 capsules by mouth 3 times daily             levothyroxine (SYNTHROID) 200 MCG tablet  Take 1 tablet by mouth daily             lurasidone (LATUDA) 40 MG TABS tablet  Take 1 tablet by mouth daily             SYNTHROID 200 MCG tablet  Take 1 tablet by mouth daily             traZODone (DESYREL) 150 MG tablet  Take 1 tablet by mouth nightly as needed for Sleep              collateral from mom was a positive one as mom felt that patient was back to himself and she had no safety concerns  Dx on discharge: Bipolar disorder mixed  Alcohol use disorder. Gabrielle Quintanilla

## 2018-07-06 RX ORDER — LEVOTHYROXINE SODIUM 0.2 MG/1
200 TABLET ORAL DAILY
Qty: 30 TABLET | Refills: 3 | OUTPATIENT
Start: 2018-07-06

## 2018-07-10 RX ORDER — LEVOTHYROXINE SODIUM 0.2 MG/1
200 TABLET ORAL DAILY
Qty: 21 TABLET | Refills: 0 | Status: SHIPPED | OUTPATIENT
Start: 2018-07-10 | End: 2018-07-31 | Stop reason: SDUPTHER

## 2018-07-10 NOTE — TELEPHONE ENCOUNTER
Pt was supposed to come in today but had to cancel due to work. He is rescheduled for 7/31/18. Please send in a short supply of synthroid. He's been out for a couple weeks and says he can feel it.

## 2018-07-31 ENCOUNTER — OFFICE VISIT (OUTPATIENT)
Dept: PRIMARY CARE CLINIC | Age: 33
End: 2018-07-31

## 2018-07-31 VITALS
HEIGHT: 71 IN | SYSTOLIC BLOOD PRESSURE: 122 MMHG | WEIGHT: 209 LBS | RESPIRATION RATE: 15 BRPM | HEART RATE: 65 BPM | TEMPERATURE: 97.4 F | OXYGEN SATURATION: 99 % | DIASTOLIC BLOOD PRESSURE: 78 MMHG | BODY MASS INDEX: 29.26 KG/M2

## 2018-07-31 DIAGNOSIS — E03.9 HYPOTHYROIDISM (ACQUIRED): Primary | ICD-10-CM

## 2018-07-31 DIAGNOSIS — D51.9 ANEMIA DUE TO VITAMIN B12 DEFICIENCY, UNSPECIFIED B12 DEFICIENCY TYPE: ICD-10-CM

## 2018-07-31 DIAGNOSIS — F33.1 MODERATE EPISODE OF RECURRENT MAJOR DEPRESSIVE DISORDER (HCC): ICD-10-CM

## 2018-07-31 DIAGNOSIS — G25.81 RLS (RESTLESS LEGS SYNDROME): ICD-10-CM

## 2018-07-31 PROCEDURE — 99213 OFFICE O/P EST LOW 20 MIN: CPT | Performed by: FAMILY MEDICINE

## 2018-07-31 RX ORDER — TRAZODONE HYDROCHLORIDE 150 MG/1
150 TABLET ORAL NIGHTLY PRN
Qty: 30 TABLET | Refills: 2 | Status: SHIPPED | OUTPATIENT
Start: 2018-07-31

## 2018-07-31 RX ORDER — LEVOTHYROXINE SODIUM 0.2 MG/1
200 TABLET ORAL DAILY
Qty: 30 TABLET | Refills: 2 | Status: SHIPPED | OUTPATIENT
Start: 2018-07-31 | End: 2018-12-23 | Stop reason: SDUPTHER

## 2018-07-31 RX ORDER — ESCITALOPRAM OXALATE 10 MG/1
10 TABLET ORAL DAILY
Qty: 30 TABLET | Refills: 2 | Status: SHIPPED | OUTPATIENT
Start: 2018-07-31

## 2018-07-31 RX ORDER — GABAPENTIN 300 MG/1
CAPSULE ORAL
Qty: 90 CAPSULE | Refills: 1 | Status: SHIPPED | OUTPATIENT
Start: 2018-07-31 | End: 2018-08-31

## 2018-07-31 NOTE — PROGRESS NOTES
Subjective  aKm [place], 34 y.o. male presents 7/31/2018 with  Chief Complaint   Patient presents with    Hypothyroidism     Pt is currently taking Synthroid 200mcg. He admits to feeling fatigued all the time.  Mood Swings     Pt is unable to afford Latuda due to not having insurance. He would like to discuss alternative medications that may cost less. HPI  Patient comes in for follow-up on hypothyroidism. He is probably on Synthroid 200 µg daily and admits he is compliant with daily usage. He denies any adverse symptoms. He denies any headaches, nausea, emesis, dull pain, shortness of breath or chest pain. Patient does admit he continues with mood swings. Was placed on Latuda and admits it is not affordable. He like to consider alternative medications. HPI    Patient Histories  Past Medical History:   Diagnosis Date    Anxiety     Bilateral carpal tunnel syndrome     Bipolar disorder with moderate depression ([facility] [address] [address] 10/12/2015    BP (high blood pressure)     Depression     Diacetylmorphine overdose 2/26/2017    Hepatitis C virus infection     Hyperthyroidism     Hypothyroidism 3/7/2017    Hypothyroidism (acquired) 6/23/2015    Steatohepatitis      Past Surgical History:   Procedure Laterality Date    APPENDECTOMY       Allergies   Allergen Reactions    Sulfa Antibiotics Hives, Shortness Of Breath and Itching     Social History     Social History    Marital status: Single     Spouse name: N/A    Number of children: N/A    Years of education: N/A     Occupational History    Not on file.      Social History Main Topics    Smoking status: Current Every Day Smoker     Packs/day: 1.00     Years: 15.00     Types: Cigarettes    Smokeless tobacco: Never Used    Alcohol use Yes      Comment: patient admits to being sober for a year but relapsing recently and having 10 beers tonight     Drug use: Yes     Types: Opiates       Comment: 3 days ago    Sexual activity: Yes     Partners: Female     Other Topics Concern    Not on file     Social History Narrative    No narrative on file     Family History   Problem Relation Age of Onset    Depression Mother     Depression Father     Depression Sister     Depression Brother     Cancer Maternal Grandfather     Heart Disease Paternal Grandmother     High Blood Pressure Paternal Grandmother     Other Paternal Grandfather      No current outpatient prescriptions on file prior to visit. No current facility-administered medications on file prior to visit. Review of Systems   Constitutional: Negative. Negative for activity change, appetite change, fatigue and fever. HENT: Negative. Negative for congestion, nosebleeds and tinnitus. Eyes: Negative. Negative for photophobia, discharge and visual disturbance. Respiratory: Negative. Negative for apnea, cough, chest tightness and shortness of breath. Cardiovascular: Negative. Negative for chest pain and palpitations. Gastrointestinal: Negative. Negative for abdominal pain, blood in stool, constipation, diarrhea, nausea and vomiting. Genitourinary: Negative. Negative for dysuria, frequency, hematuria and urgency. Musculoskeletal: Negative. Negative for back pain and neck pain. Skin: Negative. Negative for pallor. Neurological: Negative. Negative for dizziness, syncope, speech difficulty, weakness, light-headedness and headaches. Psychiatric/Behavioral: Positive for dysphoric mood. Objective    Vitals:    07/31/18 1843   BP: 122/78   Site: Right Arm   Position: Sitting   Cuff Size: Medium Adult   Pulse: 65   Resp: 15   Temp: 97.4 °F (36.3 °C)   TempSrc: Tympanic   SpO2: 99%   Weight: 209 lb (94.8 kg)   Height: 5' 11\" (1.803 m)     Physical Exam   Constitutional: He is oriented to person, place, and time. He appears well-developed and well-nourished. HENT:   Head: Normocephalic and atraumatic.    Eyes: Conjunctivae and EOM are normal. Pupils are equal,

## 2018-08-05 ASSESSMENT — ENCOUNTER SYMPTOMS
EYE DISCHARGE: 0
APNEA: 0
ABDOMINAL PAIN: 0
CHEST TIGHTNESS: 0
EYES NEGATIVE: 1
BLOOD IN STOOL: 0
RESPIRATORY NEGATIVE: 1
PHOTOPHOBIA: 0
DIARRHEA: 0
NAUSEA: 0
VOMITING: 0
CONSTIPATION: 0
BACK PAIN: 0
GASTROINTESTINAL NEGATIVE: 1
COUGH: 0
SHORTNESS OF BREATH: 0

## 2018-12-23 DIAGNOSIS — E03.9 HYPOTHYROIDISM (ACQUIRED): ICD-10-CM

## 2018-12-24 RX ORDER — LEVOTHYROXINE SODIUM 0.2 MG/1
200 TABLET ORAL DAILY
Qty: 30 TABLET | Refills: 0 | Status: SHIPPED | OUTPATIENT
Start: 2018-12-24

## 2024-09-14 NOTE — PLAN OF CARE
Problem: Altered Mood, Depressive Behavior  Goal: LTG-Able to verbalize acceptance of life and situations over which he or she has no control  Outcome: Ongoing    Goal: LTG-Able to verbalize and/or display a decrease in depressive symptoms  Outcome: Ongoing    Goal: STG-Absence of Self Harm  Outcome: Met This Shift    Goal: STG-Knowledge of positive coping patterns  Outcome: Ongoing      Problem: Substance Abuse  Goal: LTG-Absence of acute withdrawl symptoms  Outcome: Ongoing    Goal: STG-Knowledge of community resources  Outcome: Ongoing Sub-acute Rehab